# Patient Record
Sex: MALE | URBAN - METROPOLITAN AREA
[De-identification: names, ages, dates, MRNs, and addresses within clinical notes are randomized per-mention and may not be internally consistent; named-entity substitution may affect disease eponyms.]

---

## 2018-01-15 ENCOUNTER — APPOINTMENT (OUTPATIENT)
Dept: GENERAL RADIOLOGY | Facility: CLINIC | Age: 48
End: 2018-01-15
Attending: EMERGENCY MEDICINE
Payer: COMMERCIAL

## 2018-01-15 ENCOUNTER — APPOINTMENT (OUTPATIENT)
Dept: CARDIOLOGY | Facility: CLINIC | Age: 48
DRG: 247 | End: 2018-01-15
Attending: INTERNAL MEDICINE
Payer: COMMERCIAL

## 2018-01-15 ENCOUNTER — HOSPITAL ENCOUNTER (INPATIENT)
Facility: CLINIC | Age: 48
LOS: 2 days | Discharge: HOME | DRG: 247 | End: 2018-01-18
Attending: INTERNAL MEDICINE | Admitting: INTERNAL MEDICINE
Payer: COMMERCIAL

## 2018-01-15 ENCOUNTER — HOSPITAL ENCOUNTER (EMERGENCY)
Facility: CLINIC | Age: 48
Discharge: ANOTHER HOSPITAL | End: 2018-01-15
Attending: EMERGENCY MEDICINE | Admitting: EMERGENCY MEDICINE
Payer: COMMERCIAL

## 2018-01-15 VITALS
WEIGHT: 180 LBS | DIASTOLIC BLOOD PRESSURE: 112 MMHG | OXYGEN SATURATION: 97 % | RESPIRATION RATE: 18 BRPM | SYSTOLIC BLOOD PRESSURE: 152 MMHG | TEMPERATURE: 98.8 F

## 2018-01-15 DIAGNOSIS — I25.10 CORONARY ARTERY DISEASE INVOLVING NATIVE CORONARY ARTERY OF NATIVE HEART WITHOUT ANGINA PECTORIS: ICD-10-CM

## 2018-01-15 DIAGNOSIS — I21.02 ST ELEVATION MYOCARDIAL INFARCTION INVOLVING LEFT ANTERIOR DESCENDING (LAD) CORONARY ARTERY (H): Primary | ICD-10-CM

## 2018-01-15 DIAGNOSIS — I21.3 ST ELEVATION MYOCARDIAL INFARCTION (STEMI), UNSPECIFIED ARTERY (H): ICD-10-CM

## 2018-01-15 LAB
ANION GAP SERPL CALCULATED.3IONS-SCNC: 9 MMOL/L (ref 3–14)
BASOPHILS # BLD AUTO: 0.1 10E9/L (ref 0–0.2)
BASOPHILS NFR BLD AUTO: 0.5 %
BUN SERPL-MCNC: 17 MG/DL (ref 7–30)
CALCIUM SERPL-MCNC: 8.4 MG/DL (ref 8.5–10.1)
CHLORIDE SERPL-SCNC: 102 MMOL/L (ref 94–109)
CO2 SERPL-SCNC: 26 MMOL/L (ref 20–32)
CREAT SERPL-MCNC: 0.85 MG/DL (ref 0.66–1.25)
DIFFERENTIAL METHOD BLD: NORMAL
EOSINOPHIL # BLD AUTO: 0.1 10E9/L (ref 0–0.7)
EOSINOPHIL NFR BLD AUTO: 1.2 %
ERYTHROCYTE [DISTWIDTH] IN BLOOD BY AUTOMATED COUNT: 12.2 % (ref 10–15)
GFR SERPL CREATININE-BSD FRML MDRD: >90 ML/MIN/1.7M2
GLUCOSE SERPL-MCNC: 153 MG/DL (ref 70–99)
HCT VFR BLD AUTO: 47.2 % (ref 40–53)
HGB BLD-MCNC: 16.1 G/DL (ref 13.3–17.7)
IMM GRANULOCYTES # BLD: 0 10E9/L (ref 0–0.4)
IMM GRANULOCYTES NFR BLD: 0.3 %
LYMPHOCYTES # BLD AUTO: 5.2 10E9/L (ref 0.8–5.3)
LYMPHOCYTES NFR BLD AUTO: 55.7 %
MCH RBC QN AUTO: 29.9 PG (ref 26.5–33)
MCHC RBC AUTO-ENTMCNC: 34.1 G/DL (ref 31.5–36.5)
MCV RBC AUTO: 88 FL (ref 78–100)
MONOCYTES # BLD AUTO: 0.7 10E9/L (ref 0–1.3)
MONOCYTES NFR BLD AUTO: 7.8 %
NEUTROPHILS # BLD AUTO: 3.2 10E9/L (ref 1.6–8.3)
NEUTROPHILS NFR BLD AUTO: 34.5 %
NRBC # BLD AUTO: 0 10*3/UL
NRBC BLD AUTO-RTO: 0 /100
PLATELET # BLD AUTO: 262 10E9/L (ref 150–450)
PLATELET # BLD EST: NORMAL 10*3/UL
POTASSIUM SERPL-SCNC: 2.8 MMOL/L (ref 3.4–5.3)
RBC # BLD AUTO: 5.39 10E12/L (ref 4.4–5.9)
RBC MORPH BLD: NORMAL
SODIUM SERPL-SCNC: 137 MMOL/L (ref 133–144)
TROPONIN I SERPL-MCNC: <0.015 UG/L (ref 0–0.04)
VARIANT LYMPHS BLD QL SMEAR: PRESENT
WBC # BLD AUTO: 9.4 10E9/L (ref 4–11)

## 2018-01-15 PROCEDURE — 27210856 ZZH ACCESS HEART CATH CR2

## 2018-01-15 PROCEDURE — 85025 COMPLETE CBC W/AUTO DIFF WBC: CPT | Performed by: EMERGENCY MEDICINE

## 2018-01-15 PROCEDURE — 27210914 ZZH SHEATH CR8

## 2018-01-15 PROCEDURE — 25000125 ZZHC RX 250: Performed by: INTERNAL MEDICINE

## 2018-01-15 PROCEDURE — 99152 MOD SED SAME PHYS/QHP 5/>YRS: CPT

## 2018-01-15 PROCEDURE — 99152 MOD SED SAME PHYS/QHP 5/>YRS: CPT | Mod: GC | Performed by: INTERNAL MEDICINE

## 2018-01-15 PROCEDURE — C9606 PERC D-E COR REVASC W AMI S: HCPCS

## 2018-01-15 PROCEDURE — 93458 L HRT ARTERY/VENTRICLE ANGIO: CPT

## 2018-01-15 PROCEDURE — 27210946 ZZH KIT HC TOTES DISP CR8

## 2018-01-15 PROCEDURE — C1887 CATHETER, GUIDING: HCPCS

## 2018-01-15 PROCEDURE — C1769 GUIDE WIRE: HCPCS

## 2018-01-15 PROCEDURE — 99285 EMERGENCY DEPT VISIT HI MDM: CPT | Mod: 25

## 2018-01-15 PROCEDURE — 85347 COAGULATION TIME ACTIVATED: CPT

## 2018-01-15 PROCEDURE — B2151ZZ FLUOROSCOPY OF LEFT HEART USING LOW OSMOLAR CONTRAST: ICD-10-PCS | Performed by: INTERNAL MEDICINE

## 2018-01-15 PROCEDURE — 027034Z DILATION OF CORONARY ARTERY, ONE ARTERY WITH DRUG-ELUTING INTRALUMINAL DEVICE, PERCUTANEOUS APPROACH: ICD-10-PCS | Performed by: INTERNAL MEDICINE

## 2018-01-15 PROCEDURE — 92941 PRQ TRLML REVSC TOT OCCL AMI: CPT | Mod: LD | Performed by: INTERNAL MEDICINE

## 2018-01-15 PROCEDURE — 27210787 ZZH MANIFOLD CR2

## 2018-01-15 PROCEDURE — 4A023N7 MEASUREMENT OF CARDIAC SAMPLING AND PRESSURE, LEFT HEART, PERCUTANEOUS APPROACH: ICD-10-PCS | Performed by: INTERNAL MEDICINE

## 2018-01-15 PROCEDURE — C1725 CATH, TRANSLUMIN NON-LASER: HCPCS

## 2018-01-15 PROCEDURE — 27210759 ZZH DEVICE INFLATION CR6

## 2018-01-15 PROCEDURE — 93458 L HRT ARTERY/VENTRICLE ANGIO: CPT | Mod: 26 | Performed by: INTERNAL MEDICINE

## 2018-01-15 PROCEDURE — 25000132 ZZH RX MED GY IP 250 OP 250 PS 637: Performed by: EMERGENCY MEDICINE

## 2018-01-15 PROCEDURE — C1874 STENT, COATED/COV W/DEL SYS: HCPCS

## 2018-01-15 PROCEDURE — 99153 MOD SED SAME PHYS/QHP EA: CPT

## 2018-01-15 PROCEDURE — 27210795 ZZH PAD DEFIB QUICK CR4

## 2018-01-15 PROCEDURE — 93005 ELECTROCARDIOGRAM TRACING: CPT

## 2018-01-15 PROCEDURE — B2111ZZ FLUOROSCOPY OF MULTIPLE CORONARY ARTERIES USING LOW OSMOLAR CONTRAST: ICD-10-PCS | Performed by: INTERNAL MEDICINE

## 2018-01-15 PROCEDURE — 84484 ASSAY OF TROPONIN QUANT: CPT | Performed by: EMERGENCY MEDICINE

## 2018-01-15 PROCEDURE — 80048 BASIC METABOLIC PNL TOTAL CA: CPT | Performed by: EMERGENCY MEDICINE

## 2018-01-15 PROCEDURE — 27210892 ZZH CATH CR4

## 2018-01-15 PROCEDURE — 25000128 H RX IP 250 OP 636: Performed by: INTERNAL MEDICINE

## 2018-01-15 PROCEDURE — 71045 X-RAY EXAM CHEST 1 VIEW: CPT

## 2018-01-15 PROCEDURE — 27211089 ZZH KIT ACIST INJECTOR CR3

## 2018-01-15 RX ORDER — POTASSIUM CHLORIDE 7.45 MG/ML
10 INJECTION INTRAVENOUS
Status: DISCONTINUED | OUTPATIENT
Start: 2018-01-15 | End: 2018-01-16 | Stop reason: HOSPADM

## 2018-01-15 RX ORDER — HEPARIN SODIUM 1000 [USP'U]/ML
1000-10000 INJECTION, SOLUTION INTRAVENOUS; SUBCUTANEOUS EVERY 5 MIN PRN
Status: DISCONTINUED | OUTPATIENT
Start: 2018-01-15 | End: 2018-01-16 | Stop reason: HOSPADM

## 2018-01-15 RX ORDER — PRASUGREL 10 MG/1
10-60 TABLET, FILM COATED ORAL
Status: DISCONTINUED | OUTPATIENT
Start: 2018-01-15 | End: 2018-01-16 | Stop reason: HOSPADM

## 2018-01-15 RX ORDER — NITROGLYCERIN 5 MG/ML
100-500 VIAL (ML) INTRAVENOUS
Status: DISCONTINUED | OUTPATIENT
Start: 2018-01-15 | End: 2018-01-16 | Stop reason: HOSPADM

## 2018-01-15 RX ORDER — POTASSIUM CHLORIDE 29.8 MG/ML
20 INJECTION INTRAVENOUS
Status: DISCONTINUED | OUTPATIENT
Start: 2018-01-15 | End: 2018-01-16 | Stop reason: HOSPADM

## 2018-01-15 RX ORDER — NITROGLYCERIN 20 MG/100ML
.07-2 INJECTION INTRAVENOUS CONTINUOUS PRN
Status: DISCONTINUED | OUTPATIENT
Start: 2018-01-15 | End: 2018-01-16 | Stop reason: HOSPADM

## 2018-01-15 RX ORDER — PROMETHAZINE HYDROCHLORIDE 25 MG/ML
6.25-25 INJECTION, SOLUTION INTRAMUSCULAR; INTRAVENOUS EVERY 4 HOURS PRN
Status: DISCONTINUED | OUTPATIENT
Start: 2018-01-15 | End: 2018-01-16 | Stop reason: HOSPADM

## 2018-01-15 RX ORDER — NITROGLYCERIN 5 MG/ML
100-200 VIAL (ML) INTRAVENOUS
Status: DISCONTINUED | OUTPATIENT
Start: 2018-01-15 | End: 2018-01-16 | Stop reason: HOSPADM

## 2018-01-15 RX ORDER — FUROSEMIDE 10 MG/ML
20-100 INJECTION INTRAMUSCULAR; INTRAVENOUS
Status: DISCONTINUED | OUTPATIENT
Start: 2018-01-15 | End: 2018-01-16 | Stop reason: HOSPADM

## 2018-01-15 RX ORDER — LIDOCAINE HYDROCHLORIDE 10 MG/ML
1-10 INJECTION, SOLUTION EPIDURAL; INFILTRATION; INTRACAUDAL; PERINEURAL
Status: DISCONTINUED | OUTPATIENT
Start: 2018-01-15 | End: 2018-01-16 | Stop reason: HOSPADM

## 2018-01-15 RX ORDER — NICARDIPINE HYDROCHLORIDE 2.5 MG/ML
100 INJECTION INTRAVENOUS
Status: DISCONTINUED | OUTPATIENT
Start: 2018-01-15 | End: 2018-01-16 | Stop reason: HOSPADM

## 2018-01-15 RX ORDER — ENALAPRILAT 1.25 MG/ML
1.25-2.5 INJECTION INTRAVENOUS
Status: DISCONTINUED | OUTPATIENT
Start: 2018-01-15 | End: 2018-01-16 | Stop reason: HOSPADM

## 2018-01-15 RX ORDER — POTASSIUM CL/LIDO/0.9 % NACL 10MEQ/0.1L
10 INTRAVENOUS SOLUTION, PIGGYBACK (ML) INTRAVENOUS
Status: COMPLETED | OUTPATIENT
Start: 2018-01-15 | End: 2018-01-16

## 2018-01-15 RX ORDER — NITROGLYCERIN 5 MG/ML
100-500 VIAL (ML) INTRAVENOUS
Status: COMPLETED | OUTPATIENT
Start: 2018-01-15 | End: 2018-01-15

## 2018-01-15 RX ORDER — PHENYLEPHRINE HCL IN 0.9% NACL 1 MG/10 ML
20-100 SYRINGE (ML) INTRAVENOUS
Status: DISCONTINUED | OUTPATIENT
Start: 2018-01-15 | End: 2018-01-16 | Stop reason: HOSPADM

## 2018-01-15 RX ORDER — DEXTROSE MONOHYDRATE 25 G/50ML
12.5-5 INJECTION, SOLUTION INTRAVENOUS EVERY 30 MIN PRN
Status: DISCONTINUED | OUTPATIENT
Start: 2018-01-15 | End: 2018-01-16 | Stop reason: HOSPADM

## 2018-01-15 RX ORDER — LORAZEPAM 2 MG/ML
.5-2 INJECTION INTRAMUSCULAR EVERY 4 HOURS PRN
Status: DISCONTINUED | OUTPATIENT
Start: 2018-01-15 | End: 2018-01-16 | Stop reason: HOSPADM

## 2018-01-15 RX ORDER — FENTANYL CITRATE 50 UG/ML
25-50 INJECTION, SOLUTION INTRAMUSCULAR; INTRAVENOUS
Status: DISCONTINUED | OUTPATIENT
Start: 2018-01-15 | End: 2018-01-16 | Stop reason: HOSPADM

## 2018-01-15 RX ORDER — VERAPAMIL HYDROCHLORIDE 2.5 MG/ML
1-2.5 INJECTION, SOLUTION INTRAVENOUS
Status: DISCONTINUED | OUTPATIENT
Start: 2018-01-15 | End: 2018-01-16 | Stop reason: HOSPADM

## 2018-01-15 RX ORDER — NITROGLYCERIN 0.4 MG/1
0.4 TABLET SUBLINGUAL EVERY 5 MIN PRN
Status: DISCONTINUED | OUTPATIENT
Start: 2018-01-15 | End: 2018-01-15 | Stop reason: HOSPADM

## 2018-01-15 RX ORDER — DOPAMINE HYDROCHLORIDE 160 MG/100ML
2-20 INJECTION, SOLUTION INTRAVENOUS CONTINUOUS PRN
Status: DISCONTINUED | OUTPATIENT
Start: 2018-01-15 | End: 2018-01-16 | Stop reason: HOSPADM

## 2018-01-15 RX ORDER — ASPIRIN 325 MG
325 TABLET ORAL
Status: DISCONTINUED | OUTPATIENT
Start: 2018-01-15 | End: 2018-01-16 | Stop reason: HOSPADM

## 2018-01-15 RX ORDER — ATROPINE SULFATE 0.1 MG/ML
.5-1 INJECTION INTRAVENOUS
Status: DISCONTINUED | OUTPATIENT
Start: 2018-01-15 | End: 2018-01-16 | Stop reason: HOSPADM

## 2018-01-15 RX ORDER — NIFEDIPINE 10 MG/1
10 CAPSULE ORAL
Status: DISCONTINUED | OUTPATIENT
Start: 2018-01-15 | End: 2018-01-16 | Stop reason: HOSPADM

## 2018-01-15 RX ORDER — METOPROLOL TARTRATE 1 MG/ML
5 INJECTION, SOLUTION INTRAVENOUS EVERY 5 MIN PRN
Status: DISCONTINUED | OUTPATIENT
Start: 2018-01-15 | End: 2018-01-16 | Stop reason: HOSPADM

## 2018-01-15 RX ORDER — ASPIRIN 81 MG/1
324 TABLET, CHEWABLE ORAL ONCE
Status: COMPLETED | OUTPATIENT
Start: 2018-01-15 | End: 2018-01-15

## 2018-01-15 RX ORDER — CLOPIDOGREL 300 MG/1
300-600 TABLET, FILM COATED ORAL
Status: DISCONTINUED | OUTPATIENT
Start: 2018-01-15 | End: 2018-01-16 | Stop reason: HOSPADM

## 2018-01-15 RX ORDER — NALOXONE HYDROCHLORIDE 0.4 MG/ML
0.4 INJECTION, SOLUTION INTRAMUSCULAR; INTRAVENOUS; SUBCUTANEOUS EVERY 5 MIN PRN
Status: DISCONTINUED | OUTPATIENT
Start: 2018-01-15 | End: 2018-01-16 | Stop reason: HOSPADM

## 2018-01-15 RX ORDER — DOBUTAMINE HYDROCHLORIDE 200 MG/100ML
2-20 INJECTION INTRAVENOUS CONTINUOUS PRN
Status: DISCONTINUED | OUTPATIENT
Start: 2018-01-15 | End: 2018-01-16 | Stop reason: HOSPADM

## 2018-01-15 RX ORDER — NITROGLYCERIN 0.4 MG/1
0.4 TABLET SUBLINGUAL EVERY 5 MIN PRN
Status: DISCONTINUED | OUTPATIENT
Start: 2018-01-15 | End: 2018-01-16 | Stop reason: HOSPADM

## 2018-01-15 RX ORDER — VERAPAMIL HYDROCHLORIDE 2.5 MG/ML
1-2.5 INJECTION, SOLUTION INTRAVENOUS
Status: COMPLETED | OUTPATIENT
Start: 2018-01-15 | End: 2018-01-15

## 2018-01-15 RX ORDER — FLUMAZENIL 0.1 MG/ML
0.2 INJECTION, SOLUTION INTRAVENOUS
Status: DISCONTINUED | OUTPATIENT
Start: 2018-01-15 | End: 2018-01-16 | Stop reason: HOSPADM

## 2018-01-15 RX ORDER — BUPIVACAINE HYDROCHLORIDE 2.5 MG/ML
1-10 INJECTION, SOLUTION EPIDURAL; INFILTRATION; INTRACAUDAL
Status: DISCONTINUED | OUTPATIENT
Start: 2018-01-15 | End: 2018-01-16 | Stop reason: HOSPADM

## 2018-01-15 RX ORDER — PROTAMINE SULFATE 10 MG/ML
1-5 INJECTION, SOLUTION INTRAVENOUS
Status: DISCONTINUED | OUTPATIENT
Start: 2018-01-15 | End: 2018-01-16 | Stop reason: HOSPADM

## 2018-01-15 RX ORDER — ASPIRIN 81 MG/1
81-324 TABLET, CHEWABLE ORAL
Status: DISCONTINUED | OUTPATIENT
Start: 2018-01-15 | End: 2018-01-16 | Stop reason: HOSPADM

## 2018-01-15 RX ORDER — METHYLPREDNISOLONE SODIUM SUCCINATE 125 MG/2ML
125 INJECTION, POWDER, LYOPHILIZED, FOR SOLUTION INTRAMUSCULAR; INTRAVENOUS
Status: DISCONTINUED | OUTPATIENT
Start: 2018-01-15 | End: 2018-01-16 | Stop reason: HOSPADM

## 2018-01-15 RX ORDER — MORPHINE SULFATE 2 MG/ML
1-2 INJECTION, SOLUTION INTRAMUSCULAR; INTRAVENOUS EVERY 5 MIN PRN
Status: DISCONTINUED | OUTPATIENT
Start: 2018-01-15 | End: 2018-01-16 | Stop reason: HOSPADM

## 2018-01-15 RX ORDER — ADENOSINE 3 MG/ML
12-12000 INJECTION, SOLUTION INTRAVENOUS
Status: DISCONTINUED | OUTPATIENT
Start: 2018-01-15 | End: 2018-01-16 | Stop reason: HOSPADM

## 2018-01-15 RX ORDER — LIDOCAINE HYDROCHLORIDE 10 MG/ML
30 INJECTION, SOLUTION EPIDURAL; INFILTRATION; INTRACAUDAL; PERINEURAL
Status: DISCONTINUED | OUTPATIENT
Start: 2018-01-15 | End: 2018-01-16 | Stop reason: HOSPADM

## 2018-01-15 RX ORDER — HYDRALAZINE HYDROCHLORIDE 20 MG/ML
10-20 INJECTION INTRAMUSCULAR; INTRAVENOUS
Status: DISCONTINUED | OUTPATIENT
Start: 2018-01-15 | End: 2018-01-16 | Stop reason: HOSPADM

## 2018-01-15 RX ORDER — CLOPIDOGREL BISULFATE 75 MG/1
75 TABLET ORAL
Status: DISCONTINUED | OUTPATIENT
Start: 2018-01-15 | End: 2018-01-16 | Stop reason: HOSPADM

## 2018-01-15 RX ORDER — ONDANSETRON 2 MG/ML
4 INJECTION INTRAMUSCULAR; INTRAVENOUS EVERY 4 HOURS PRN
Status: DISCONTINUED | OUTPATIENT
Start: 2018-01-15 | End: 2018-01-16 | Stop reason: HOSPADM

## 2018-01-15 RX ORDER — SODIUM NITROPRUSSIDE 25 MG/ML
100-200 INJECTION INTRAVENOUS
Status: DISCONTINUED | OUTPATIENT
Start: 2018-01-15 | End: 2018-01-16 | Stop reason: HOSPADM

## 2018-01-15 RX ORDER — DIPHENHYDRAMINE HYDROCHLORIDE 50 MG/ML
25-50 INJECTION INTRAMUSCULAR; INTRAVENOUS
Status: DISCONTINUED | OUTPATIENT
Start: 2018-01-15 | End: 2018-01-16 | Stop reason: HOSPADM

## 2018-01-15 RX ORDER — PROTAMINE SULFATE 10 MG/ML
25-100 INJECTION, SOLUTION INTRAVENOUS EVERY 5 MIN PRN
Status: DISCONTINUED | OUTPATIENT
Start: 2018-01-15 | End: 2018-01-16 | Stop reason: HOSPADM

## 2018-01-15 RX ORDER — EPINEPHRINE 1 MG/ML
0.3 INJECTION, SOLUTION, CONCENTRATE INTRAVENOUS
Status: DISCONTINUED | OUTPATIENT
Start: 2018-01-15 | End: 2018-01-16 | Stop reason: HOSPADM

## 2018-01-15 RX ADMIN — SODIUM CHLORIDE 600 ML: 9 INJECTION, SOLUTION INTRAVENOUS at 23:35

## 2018-01-15 RX ADMIN — NITROGLYCERIN 0.4 MG: 0.4 TABLET SUBLINGUAL at 22:43

## 2018-01-15 RX ADMIN — Medication 7000 UNITS: at 23:32

## 2018-01-15 RX ADMIN — MIDAZOLAM 2.5 MG: 1 INJECTION INTRAMUSCULAR; INTRAVENOUS at 23:54

## 2018-01-15 RX ADMIN — VERAPAMIL HYDROCHLORIDE 2.5 MG: 2.5 INJECTION, SOLUTION INTRAVENOUS at 23:29

## 2018-01-15 RX ADMIN — NITROGLYCERIN 0.4 MG: 0.4 TABLET SUBLINGUAL at 22:38

## 2018-01-15 RX ADMIN — Medication 10 MEQ: at 23:34

## 2018-01-15 RX ADMIN — FENTANYL CITRATE 125 MCG: 50 INJECTION, SOLUTION INTRAMUSCULAR; INTRAVENOUS at 23:54

## 2018-01-15 RX ADMIN — NITROGLYCERIN 300 MCG: 5 INJECTION, SOLUTION INTRAVENOUS at 23:59

## 2018-01-15 RX ADMIN — NITROGLYCERIN 400 MCG: 5 INJECTION, SOLUTION INTRAVENOUS at 23:29

## 2018-01-15 RX ADMIN — NITROGLYCERIN 200 MCG: 5 INJECTION, SOLUTION INTRAVENOUS at 23:44

## 2018-01-15 RX ADMIN — NITROGLYCERIN 0.4 MG: 0.4 TABLET SUBLINGUAL at 22:30

## 2018-01-15 RX ADMIN — TICAGRELOR 180 MG: 90 TABLET ORAL at 22:38

## 2018-01-15 RX ADMIN — ONDANSETRON 4 MG: 2 INJECTION INTRAMUSCULAR; INTRAVENOUS at 23:33

## 2018-01-15 RX ADMIN — ASPIRIN 81 MG 324 MG: 81 TABLET ORAL at 22:36

## 2018-01-15 ASSESSMENT — ENCOUNTER SYMPTOMS
VOMITING: 0
NAUSEA: 0
SHORTNESS OF BREATH: 1
DIAPHORESIS: 0

## 2018-01-15 NOTE — IP AVS SNAPSHOT
MRN:[id number]                      After Visit Summary   1/15/2018    Floresita Vanegas    MRN: [id number]           Thank you!     Thank you for choosing [facility] for your care. Our goal is always to provide you with excellent care. Hearing back from our patients is one way we can continue to improve our services. Please take a few minutes to complete the written survey that you may receive in the mail after you visit with us. Thank you!        Patient Information     Date Of Birth          1970        Designated Caregiver       Most Recent Value    Caregiver    Will someone help with your care after discharge? no      About your hospital stay     You were admitted on:  January 15, 2018 You last received care in the:  [facility]    You were discharged on:  January 18, 2018        Reason for your hospital stay       You had a heart attack, a stent was placed in your left anterior descending artery.                  Who to Call     For medical emergencies, please call 911.  For non-urgent questions about your medical care, please call your primary care provider or clinic, [phone]          Attending Provider     Provider David Vaughn MD Cardiology       Primary Care Provider Office Phone # Fax #    Tad Correa [phone]      Follow-up Appointments     Follow Up and recommended labs and tests       Follow up with primary care provider, Dr. Tad Correa, within 7 days for hospital follow- up.  The following labs/tests are recommended: BMP and lipids.    You need to establish care with a cardiologist at [facility], you should be seen with in 7-10 days. They will need to connect you to their cardiac rehab.    -A follow-up appointment has been made for you with Dr. Tad Correa on  Monday, January 29th at 1:20 PM at [facility].  Please call the clinic at [phone] should you need to  change or cancel your appointment.    [facility]  [address] [place] [address]   [phone]        -A follow-up appointment has been made for you with Dr. Reed on Tuesday, February 6th at 10:00 AM at  [facility]. Please call the clinic at [phone] should you need to change or cancel your appointment.    [facility]  [address]  [place], [place] [address] [phone]                  Your next 10 appointments already scheduled     Jan 25, 2018  8:00 AM CST   Cardiac Evaluation with [facility] [address] [place] [address] ([facility])    [address] [facility], [facility] [address]  [facility] [id number] [phone]              Additional Services     CARDIAC REHAB REFERRAL       Patient may choose their preference of the site for Cardiac Rehab.                  Further instructions from your care team       -A follow-up appointment has been made for you with Dr. Tad Correa on  Monday, January 29th at 1:20 PM at [facility].  Please call the clinic at [phone] should you need to change or cancel your appointment.    [facility]  [address] [place], [place] [address] [phone]        -A follow-up appointment has been made for you with Dr. Reed on Tuesday, February 6th at 10:00 AM at  [facility]. Please call the clinic at [phone] should you need to change or cancel your appointment.    [facility]  [address] [facility]  [place], [place] [address] [phone]    A follow-up appointment was scheduled for you [see above].  It is very important to go to it--bring these papers and your insurance card with you.  At the visit, talk about your hospital stay.  Tell your doctor how you feel.  Show your new list of medications.  Your doctor will update records, make sure you are still  doing OK, and decide if any tests or medication changes are needed.          You had complaints of feeling lightheaded while in hospital. It may take some time for your body to adjust to lower blood pressures and sedation to wear off. If after a couple days your symptoms do not improve or are worsening contact the heart clinic.   Rise slowly from lying to sitting to standing. Also, monitor your blood pressures and heart rate at home. If your heart rate is below 55 and symptomatic (dizzy) or Systolic (top number) is below 100 contact your heart   clinic.      Lifelong aspirin, Brilinta 90 BID for at least a year uninterrupted    Cardiac Angiogram Discharge Instructions - Radial    After you go home:      Have an adult stay with you until tomorrow.    Drink extra fluids for 2 days.    You may resume your normal diet.    No smoking       For 24 hours - due to the sedation you received:    Relax and take it easy.    Do NOT make any important or legal decisions.    Do NOT drive or operate machines at home or at work.    Do NOT drink alcohol.    Care of Wrist Puncture Site:      For the first 24 hrs - check the puncture site every 1-2 hours while awake.    It is normal to have soreness at the puncture site and mild tingling in your hand for up to 3 days.    Remove the bandaid after 24 hours. If there is minor oozing, apply another bandaid and remove it after 12 hours.    You may shower tomorrow.  Do NOT take a bath, or use a hot tub or pool for at least 3 days. Do NOT scrub the site. Do not use lotion or powder near the puncture site.           Activity:        For 2 days:     do not use your hand or arm to support your weight (such as rising from a chair)     do not bend your wrist (such as lifting a garage door).    do not lift more than 5 pounds or exercise your arm (such as tennis, golf or bowling).    Do NOT do any heavy activity such as exercise, lifting, or straining.     Bleeding:      If you start bleeding from  the site in your wrist, sit down and press firmly on/above the site for 10 minutes.     Once bleeding stops, keep arm still for 2 hours.     Call [facility] as soon as you can.       Call 911 right away if you have heavy bleeding or bleeding that does not stop.      Medicines:      If you are taking antiplatelet medications such as Plavix, Brilinta or Effient, do not stop taking it until you talk to your cardiologist.        If you are on Metformin (Glucophage), do not restart it until you have blood tests (within 2 to 3 days after discharge).  After you have your blood drawn, you may restart the Metformin.     Take your medications, including blood thinners, unless your provider tells you not to.  If you take Coumadin (Warfarin), have your INR checked by your provider in  3-5 days. Call your clinic to schedule this.    If you have stopped any medicines, check with your provider about when to restart them.    Follow Up Appointments:      Follow up with [facility] Nurse Practitioner at [facility] of patient preference in 7-10 days.    Call the clinic if:      You have a large or growing hard lump around the site.    The site is red, swollen, hot or tender.    Blood or fluid is draining from the site.    You have chills or a fever greater than 101 F (38 C).    Your arm turns feels numb, cool or changes color.    You have hives, a rash or unusual itching.    Any questions or concerns.                          Pending Results     No orders found from 1/13/2018 to 1/16/2018.            Statement of Approval     Ordered          01/18/18 1021  I have reviewed and agree with all the recommendations and orders detailed in this document.  EFFECTIVE NOW     Approved and electronically signed by:  Mary Ann Hinojosa APRN CNP             Admission Information     Date & Time Provider Department Dept. Phone    1/15/2018 David Worley MD [facility] [phone]      Your  "Vitals Were     Blood Pressure Temperature Respirations Height Weight Pulse Oximetry    119/83 (BP Location: Left arm) 98.7  F (37.1  C) (Oral) 16 1.702 m (5' 7\") 84.1 kg (185 lb 8 oz) 99%    BMI (Body Mass Index)                   29.05 kg/m2           [employer] Information     [employer] lets you send messages to your doctor, view your test results, renew your prescriptions, schedule appointments and more. To sign up, go to [email or web address] . Click on \"Log in\" on the left side of the screen, which will take you to the Welcome page. Then click on \"Sign up Now\" on the right side of the page.     You will be asked to enter the access code listed below, as well as some personal information. Please follow the directions to create your username and password.     Your access code is: [email or web address]  Expires: 2018 10:04 AM     Your access code will  in 90 days. If you need help or a new code, please call your [facility] clinic or [phone].        Care EveryWhere ID     This is your Care EveryWhere ID. This could be used by other organizations to access your [facility] medical records  [id number]        Equal Access to Services     GREGG COFFEY AH: Haleigh yooo Someghana, waaxda luqadaha, qaybta kaalmada adeegyada, lee roger. So [facility] [phone].    ATENCIÓN: Si habla español, tiene a sandoval disposición servicios gratuitos de asistencia lingüística. Llame al [phone].    We comply with applicable federal civil rights laws and [place] laws. We do not discriminate on the basis of race, color, national origin, age, disability, sex, sexual orientation, or gender identity.               Review of your medicines      START taking        Dose / Directions    aspirin 81 MG EC tablet   Used for:  Coronary artery disease involving native coronary artery of native heart without angina pectoris        Dose:  81 mg   Start taking on:  2018   Take 1 tablet (81 mg) by mouth daily "   Quantity:  30 tablet   Refills:  0       lisinopril 5 MG tablet   Commonly known as:  PRINIVIL/ZESTRIL   Used for:  ST elevation myocardial infarction involving left anterior descending (LAD) coronary artery (H)        Dose:  5 mg   Start taking on:  1/19/2018   Take 1 tablet (5 mg) by mouth daily   Quantity:  30 tablet   Refills:  0       metoprolol tartrate 25 MG tablet   Commonly known as:  LOPRESSOR   Used for:  Coronary artery disease involving native coronary artery of native heart without angina pectoris        Dose:  25 mg   Take 1 tablet (25 mg) by mouth 2 times daily   Quantity:  60 tablet   Refills:  0       nitroGLYcerin 0.4 MG sublingual tablet   Commonly known as:  NITROSTAT   Used for:  Coronary artery disease involving native coronary artery of native heart without angina pectoris        For chest pain place 1 tablet under the tongue every 5 minutes for 3 doses. If symptoms persist 5 minutes after 1st dose call 911.   Quantity:  25 tablet   Refills:  0       rosuvastatin 20 MG tablet   Commonly known as:  CRESTOR   Used for:  Coronary artery disease involving native coronary artery of native heart without angina pectoris        Dose:  20 mg   Start taking on:  1/19/2018   Take 1 tablet (20 mg) by mouth daily   Quantity:  30 tablet   Refills:  0       ticagrelor 90 MG tablet   Commonly known as:  BRILINTA   Used for:  ST elevation myocardial infarction involving left anterior descending (LAD) coronary artery (H)        Dose:  90 mg   Take 1 tablet (90 mg) by mouth every 12 hours   Quantity:  60 tablet   Refills:  0            Where to get your medicines      These medications were sent to [facility] - [address]  [address] [facility] [address], [place] [address]     Phone:  [phone]     aspirin 81 MG EC tablet    lisinopril 5 MG tablet    metoprolol tartrate 25 MG tablet    nitroGLYcerin 0.4 MG sublingual tablet    rosuvastatin 20 MG tablet    ticagrelor 90 MG tablet                 Protect others around you: Learn how to safely use, store and throw away your medicines at www.disposemymeds.org.             Medication List: This is a list of all your medications and when to take them. Check marks below indicate your daily home schedule. Keep this list as a reference.      Medications           Morning Afternoon Evening Bedtime As Needed    aspirin 81 MG EC tablet   Take 1 tablet (81 mg) by mouth daily   Start taking on:  1/19/2018   Last time this was given:  81 mg on 1/18/2018  8:40 AM                                   lisinopril 5 MG tablet   Commonly known as:  PRINIVIL/ZESTRIL   Take 1 tablet (5 mg) by mouth daily   Start taking on:  1/19/2018   Last time this was given:  5 mg on 1/18/2018  8:40 AM                                   metoprolol tartrate 25 MG tablet   Commonly known as:  LOPRESSOR   Take 1 tablet (25 mg) by mouth 2 times daily   Last time this was given:  25 mg on 1/18/2018  8:40 AM                                      nitroGLYcerin 0.4 MG sublingual tablet   Commonly known as:  NITROSTAT   For chest pain place 1 tablet under the tongue every 5 minutes for 3 doses. If symptoms persist 5 minutes after 1st dose call 911.                                   rosuvastatin 20 MG tablet   Commonly known as:  CRESTOR   Take 1 tablet (20 mg) by mouth daily   Start taking on:  1/19/2018   Last time this was given:  20 mg on 1/18/2018  8:40 AM                                   ticagrelor 90 MG tablet   Commonly known as:  BRILINTA   Take 1 tablet (90 mg) by mouth every 12 hours   Last time this was given:  90 mg on 1/18/2018  8:40 AM                                                More Information        Ticagrelor oral tablet  Brand Name: BRILINTA  What is this medicine?  TICAGRELOR (KIMBERLYN ka GREL or) helps to prevent blood clots. This medicine is used to prevent heart attack, stroke, or other vascular events in people who have had a recent heart attack or who have severe  chest pain.  How should I use this medicine?  Take this medicine by mouth with a glass of water. Follow the directions on the prescription label. You can take it with or without food. If it upsets your stomach, take it with food. Take your medicine at regular intervals. Do not take it more often than directed. Do not stop taking except on your doctor's advice.  Talk to you pediatrician regarding the use of this medicine in children. Special care may be needed.  What side effects may I notice from receiving this medicine?  Side effects that you should report to your doctor or health care professional as soon as possible:    allergic reactions like skin rash, itching or hives, swelling of the face, lips, or tongue    breathing problems    fast or irregular heartbeat    feeling faint or light-headed, falls    signs and symptoms of bleeding such as bloody or black, tarry stools; red or dark-brown urine; spitting up blood or brown material that looks like coffee grounds; red spots on the skin; unusual bruising or bleeding from the eye, gums, or nose  Side effects that usually do not require medical attention (report to your doctor or health care professional if they continue or are bothersome):    breast enlargement in both males and females    diarrhea    dizziness    headache    tiredness    upset stomach  What may interact with this medicine?    certain antibiotics like clarithromycin and telithromycin    certain medicines for fungal infections like itraconazole, ketoconazole, and voriconazole    certain medicines for HIV infection like atazanavir, indinavir, nelfinavir, ritonavir, and saquinavir    certain medicines for seizures like carbamazepine, phenobarbital, and phenytoin    certain medicines that treat or prevent blood clots like warfarin    dexamethasone    digoxin    lovastatin    nefazodone    rifampin    simvastatin  What if I miss a dose?  If you miss a dose, take it as soon as you can. If it is almost  time for your next dose, take only that dose. Do not take double or extra doses.  Where should I keep my medicine?  Keep out of the reach of children.  Store at room temperature of 59 to 86 degrees F (15 to 30 degrees C). Throw away any unused medicine after the expiration date.  What should I tell my health care provider before I take this medicine?  They need to know if you have any of these conditions:    bleeding disorders    bleeding in the brain    having surgery    history of irregular heartbeat    history of stomach bleeding    liver disease    an unusual or allergic reaction to ticagrelor, other medicines, foods, dyes, or preservatives    pregnant or trying to get pregnant    breast-feeding  What should I watch for while using this medicine?  Visit your doctor or health care professional for regular check ups. Do not stop taking you medicine unless your doctor tells you to.  Notify your doctor or health care professional and seek emergency treatment if you develop breathing problems; changes in vision; chest pain; severe, sudden headache; pain, swelling, warmth in the leg; trouble speaking; sudden numbness or weakness of the face, arm, or leg. These can be signs that your condition has gotten worse.  If you are going to have surgery or dental work, tell your doctor or health care professional that you are taking this medicine.  You should take aspirin every day with this medicine. Do not take more than 100 mg each day. Talk to your doctor if you have questions.  NOTE:This sheet is a summary. It may not cover all possible information. If you have questions about this medicine, talk to your doctor, pharmacist, or health care provider. Copyright  2017 Elsevier                Metoprolol tablets  Brand Name: Lopressor  What is this medicine?  METOPROLOL (me TOE proe lole) is a beta-blocker. Beta-blockers reduce the workload on the heart and help it to beat more regularly. This medicine is used to treat high  blood pressure and to prevent chest pain. It is also used to after a heart attack and to prevent an additional heart attack from occurring.  How should I use this medicine?  Take this medicine by mouth with a drink of water. Follow the directions on the prescription label. Take this medicine immediately after meals. Take your doses at regular intervals. Do not take more medicine than directed. Do not stop taking this medicine suddenly. This could lead to serious heart-related effects.  Talk to your pediatrician regarding the use of this medicine in children. Special care may be needed.  What side effects may I notice from receiving this medicine?  Side effects that you should report to your doctor or health care professional as soon as possible:    allergic reactions like skin rash, itching or hives    cold or numb hands or feet    depression    difficulty breathing    faint    fever with sore throat    irregular heartbeat, chest pain    rapid weight gain    swollen legs or ankles  Side effects that usually do not require medical attention (report to your doctor or health care professional if they continue or are bothersome):    anxiety or nervousness    change in sex drive or performance    dry skin    headache    nightmares or trouble sleeping    short term memory loss    stomach upset or diarrhea    unusually tired  What may interact with this medicine?  This medicine may interact with the following medications:    certain medicines for blood pressure, heart disease, irregular heart beat    certain medicines for depression like monoamine oxidase (MAO) inhibitors, fluoxetine, or paroxetine    clonidine    dobutamine    epinephrine    isoproterenol    reserpine  What if I miss a dose?  If you miss a dose, take it as soon as you can. If it is almost time for your next dose, take only that dose. Do not take double or extra doses.  Where should I keep my medicine?  Keep out of the reach of children.  Store at room  temperature between 15 and 30 degrees C (59 and 86 degrees F). Throw away any unused medicine after the expiration date.  What should I tell my health care provider before I take this medicine?  They need to know if you have any of these conditions:    diabetes    heart or vessel disease like slow heart rate, worsening heart failure, heart block, sick sinus syndrome or Raynaud's disease    kidney disease    liver disease    lung or breathing disease, like asthma or emphysema    pheochromocytoma    thyroid disease    an unusual or allergic reaction to metoprolol, other beta-blockers, medicines, foods, dyes, or preservatives    pregnant or trying to get pregnant    breast-feeding  What should I watch for while using this medicine?  Visit your doctor or health care professional for regular check ups. Contact your doctor right away if your symptoms worsen. Check your blood pressure and pulse rate regularly. Ask your health care professional what your blood pressure and pulse rate should be, and when you should contact them.  You may get drowsy or dizzy. Do not drive, use machinery, or do anything that needs mental alertness until you know how this medicine affects you. Do not sit or stand up quickly, especially if you are an older patient. This reduces the risk of dizzy or fainting spells. Contact your doctor if these symptoms continue. Alcohol may interfere with the effect of this medicine. Avoid alcoholic drinks.  NOTE:This sheet is a summary. It may not cover all possible information. If you have questions about this medicine, talk to your doctor, pharmacist, or health care provider. Copyright  2017 Elsevier                Lisinopril Oral tablet  What is this medicine?  LISINOPRIL (lyse IN oh pril) is an ACE inhibitor. This medicine is used to treat high blood pressure and heart failure. It is also used to protect the heart immediately after a heart attack.  This medicine may be used for other purposes; ask your  health care provider or pharmacist if you have questions.  What should I tell my health care provider before I take this medicine?  They need to know if you have any of these conditions:    diabetes    heart or blood vessel disease    immune system disease like lupus or scleroderma    kidney disease    low blood pressure    previous swelling of the tongue, face, or lips with difficulty breathing, difficulty swallowing, hoarseness, or tightening of the throat    an unusual or allergic reaction to lisinopril, other ACE inhibitors, insect venom, foods, dyes, or preservatives    pregnant or trying to get pregnant    breast-feeding  How should I use this medicine?  Take this medicine by mouth with a glass of water. Follow the directions on your prescription label. You may take this medicine with or without food. Take your medicine at regular intervals. Do not stop taking this medicine except on the advice of your doctor or health care professional.  Talk to your pediatrician regarding the use of this medicine in children. Special care may be needed. While this drug may be prescribed for children as young as 6 years of age for selected conditions, precautions do apply.  Overdosage: If you think you have taken too much of this medicine contact a poison control center or emergency room at once.  NOTE: This medicine is only for you. Do not share this medicine with others.  What if I miss a dose?  If you miss a dose, take it as soon as you can. If it is almost time for your next dose, take only that dose. Do not take double or extra doses.  What may interact with this medicine?    diuretics    lithium    NSAIDs, medicines for pain and inflammation, like ibuprofen or naproxen    over-the-counter herbal supplements like hawthorn    potassium salts or potassium supplements    salt substitutes  This list may not describe all possible interactions. Give your health care provider a list of all the medicines, herbs, non-prescription  drugs, or dietary supplements you use. Also tell them if you smoke, drink alcohol, or use illegal drugs. Some items may interact with your medicine.  What should I watch for while using this medicine?  Visit your doctor or health care professional for regular check ups. Check your blood pressure as directed. Ask your doctor what your blood pressure should be, and when you should contact him or her. Call your doctor or health care professional if you notice an irregular or fast heart beat.  Women should inform their doctor if they wish to become pregnant or think they might be pregnant. There is a potential for serious side effects to an unborn child. Talk to your health care professional or pharmacist for more information.  Check with your doctor or health care professional if you get an attack of severe diarrhea, nausea and vomiting, or if you sweat a lot. The loss of too much body fluid can make it dangerous for you to take this medicine.  You may get drowsy or dizzy. Do not drive, use machinery, or do anything that needs mental alertness until you know how this drug affects you. Do not stand or sit up quickly, especially if you are an older patient. This reduces the risk of dizzy or fainting spells. Alcohol can make you more drowsy and dizzy. Avoid alcoholic drinks.  Avoid salt substitutes unless you are told otherwise by your doctor or health care professional.  Do not treat yourself for coughs, colds, or pain while you are taking this medicine without asking your doctor or health care professional for advice. Some ingredients may increase your blood pressure.  What side effects may I notice from receiving this medicine?  Side effects that you should report to your doctor or health care professional as soon as possible:    abdominal pain with or without nausea or vomiting    allergic reactions like skin rash or hives, swelling of the hands, feet, face, lips, throat, or tongue    dark urine    difficulty  breathing    dizzy, lightheaded or fainting spell    fever or sore throat    irregular heart beat, chest pain    pain or difficulty passing urine    redness, blistering, peeling or loosening of the skin, including inside the mouth    unusually weak    yellowing of the eyes or skin  Side effects that usually do not require medical attention (report to your doctor or health care professional if they continue or are bothersome):    change in taste    cough    decreased sexual function or desire    headache    sun sensitivity    tiredness  This list may not describe all possible side effects. Call your doctor for medical advice about side effects. You may report side effects to FDA at 6-713-FDA-3153.  Where should I keep my medicine?  Keep out of the reach of children.  Store at room temperature between 15 and 30 degrees C (59 and 86 degrees F). Protect from moisture. Keep container tightly closed. Throw away any unused medicine after the expiration date.  NOTE:This sheet is a summary. It may not cover all possible information. If you have questions about this medicine, talk to your doctor, pharmacist, or health care provider. Copyright  2016 Gold Standard

## 2018-01-15 NOTE — IP AVS SNAPSHOT
[facility]    [address] [place] [address]., [address]    [place] [address]    Phone:  [phone]                                       After Visit Summary   1/15/2018    Floresita Vanegas    MRN: [id number]           After Visit Summary Signature Page     I have received my discharge instructions, and my questions have been answered. I have discussed any challenges I see with this plan with the nurse or doctor.    ..........................................................................................................................................  Patient/Patient Representative Signature      ..........................................................................................................................................  Patient Representative Print Name and Relationship to Patient    ..................................................               ................................................  Date                                            Time    ..........................................................................................................................................  Reviewed by Signature/Title    ...................................................              ..............................................  Date                                                            Time

## 2018-01-16 PROBLEM — I21.02 STEMI INVOLVING LEFT ANTERIOR DESCENDING CORONARY ARTERY (H): Status: ACTIVE | Noted: 2018-01-16

## 2018-01-16 LAB
ANION GAP SERPL CALCULATED.3IONS-SCNC: 7 MMOL/L (ref 3–14)
BUN SERPL-MCNC: 11 MG/DL (ref 7–30)
CALCIUM SERPL-MCNC: 8.5 MG/DL (ref 8.5–10.1)
CHLORIDE SERPL-SCNC: 104 MMOL/L (ref 94–109)
CHOLEST SERPL-MCNC: 214 MG/DL
CO2 SERPL-SCNC: 25 MMOL/L (ref 20–32)
CREAT SERPL-MCNC: 0.76 MG/DL (ref 0.66–1.25)
ERYTHROCYTE [DISTWIDTH] IN BLOOD BY AUTOMATED COUNT: 12.7 % (ref 10–15)
GFR SERPL CREATININE-BSD FRML MDRD: >90 ML/MIN/1.7M2
GLUCOSE SERPL-MCNC: 102 MG/DL (ref 70–99)
HCT VFR BLD AUTO: 43.4 % (ref 40–53)
HDLC SERPL-MCNC: 40 MG/DL
HGB BLD-MCNC: 15 G/DL (ref 13.3–17.7)
INTERPRETATION ECG - MUSE: NORMAL
KCT BLD-ACNC: 526 SEC (ref 105–167)
LDLC SERPL CALC-MCNC: 143 MG/DL
MCH RBC QN AUTO: 30.2 PG (ref 26.5–33)
MCHC RBC AUTO-ENTMCNC: 34.6 G/DL (ref 31.5–36.5)
MCV RBC AUTO: 87 FL (ref 78–100)
NONHDLC SERPL-MCNC: 174 MG/DL
PLATELET # BLD AUTO: 216 10E9/L (ref 150–450)
POTASSIUM SERPL-SCNC: 4.1 MMOL/L (ref 3.4–5.3)
POTASSIUM SERPL-SCNC: 4.2 MMOL/L (ref 3.4–5.3)
RBC # BLD AUTO: 4.97 10E12/L (ref 4.4–5.9)
SODIUM SERPL-SCNC: 136 MMOL/L (ref 133–144)
TRIGL SERPL-MCNC: 155 MG/DL
TROPONIN I SERPL-MCNC: 120.61 UG/L (ref 0–0.04)
TROPONIN I SERPL-MCNC: 86.7 UG/L (ref 0–0.04)
WBC # BLD AUTO: 9.5 10E9/L (ref 4–11)

## 2018-01-16 PROCEDURE — 99291 CRITICAL CARE FIRST HOUR: CPT | Mod: 25 | Performed by: INTERNAL MEDICINE

## 2018-01-16 PROCEDURE — 27210787 ZZH MANIFOLD CR2

## 2018-01-16 PROCEDURE — 84484 ASSAY OF TROPONIN QUANT: CPT | Performed by: INTERNAL MEDICINE

## 2018-01-16 PROCEDURE — 36415 COLL VENOUS BLD VENIPUNCTURE: CPT | Performed by: INTERNAL MEDICINE

## 2018-01-16 PROCEDURE — 27210759 ZZH DEVICE INFLATION CR6

## 2018-01-16 PROCEDURE — 27210856 ZZH ACCESS HEART CATH CR2

## 2018-01-16 PROCEDURE — 25000132 ZZH RX MED GY IP 250 OP 250 PS 637: Performed by: INTERNAL MEDICINE

## 2018-01-16 PROCEDURE — 27210795 ZZH PAD DEFIB QUICK CR4

## 2018-01-16 PROCEDURE — 84132 ASSAY OF SERUM POTASSIUM: CPT | Performed by: INTERNAL MEDICINE

## 2018-01-16 PROCEDURE — 21000000 ZZH R&B IMCU HEART CARE

## 2018-01-16 PROCEDURE — 93010 ELECTROCARDIOGRAM REPORT: CPT | Mod: 76 | Performed by: INTERNAL MEDICINE

## 2018-01-16 PROCEDURE — 93005 ELECTROCARDIOGRAM TRACING: CPT

## 2018-01-16 PROCEDURE — 85027 COMPLETE CBC AUTOMATED: CPT | Performed by: INTERNAL MEDICINE

## 2018-01-16 PROCEDURE — C1769 GUIDE WIRE: HCPCS

## 2018-01-16 PROCEDURE — C1887 CATHETER, GUIDING: HCPCS

## 2018-01-16 PROCEDURE — 27210914 ZZH SHEATH CR8

## 2018-01-16 PROCEDURE — 27211089 ZZH KIT ACIST INJECTOR CR3

## 2018-01-16 PROCEDURE — 93010 ELECTROCARDIOGRAM REPORT: CPT | Performed by: INTERNAL MEDICINE

## 2018-01-16 PROCEDURE — C1725 CATH, TRANSLUMIN NON-LASER: HCPCS

## 2018-01-16 PROCEDURE — 27210946 ZZH KIT HC TOTES DISP CR8

## 2018-01-16 PROCEDURE — 99232 SBSQ HOSP IP/OBS MODERATE 35: CPT | Mod: 25 | Performed by: INTERNAL MEDICINE

## 2018-01-16 PROCEDURE — 25000128 H RX IP 250 OP 636: Performed by: INTERNAL MEDICINE

## 2018-01-16 PROCEDURE — 80048 BASIC METABOLIC PNL TOTAL CA: CPT | Performed by: INTERNAL MEDICINE

## 2018-01-16 PROCEDURE — 80061 LIPID PANEL: CPT | Performed by: INTERNAL MEDICINE

## 2018-01-16 PROCEDURE — 27210892 ZZH CATH CR4

## 2018-01-16 PROCEDURE — C1874 STENT, COATED/COV W/DEL SYS: HCPCS

## 2018-01-16 RX ORDER — ATROPINE SULFATE 0.1 MG/ML
0.5 INJECTION INTRAVENOUS EVERY 5 MIN PRN
Status: ACTIVE | OUTPATIENT
Start: 2018-01-16 | End: 2018-01-16

## 2018-01-16 RX ORDER — NITROGLYCERIN 0.4 MG/1
0.4 TABLET SUBLINGUAL EVERY 5 MIN PRN
Status: DISCONTINUED | OUTPATIENT
Start: 2018-01-16 | End: 2018-01-18 | Stop reason: HOSPADM

## 2018-01-16 RX ORDER — LISINOPRIL 5 MG/1
5 TABLET ORAL DAILY
Status: DISCONTINUED | OUTPATIENT
Start: 2018-01-16 | End: 2018-01-18 | Stop reason: HOSPADM

## 2018-01-16 RX ORDER — SODIUM CHLORIDE 9 MG/ML
INJECTION, SOLUTION INTRAVENOUS CONTINUOUS
Status: ACTIVE | OUTPATIENT
Start: 2018-01-16 | End: 2018-01-16

## 2018-01-16 RX ORDER — ACETAMINOPHEN 325 MG/1
325-650 TABLET ORAL EVERY 4 HOURS PRN
Status: DISCONTINUED | OUTPATIENT
Start: 2018-01-16 | End: 2018-01-18 | Stop reason: HOSPADM

## 2018-01-16 RX ORDER — IOPAMIDOL 755 MG/ML
150 INJECTION, SOLUTION INTRAVASCULAR ONCE
Status: COMPLETED | OUTPATIENT
Start: 2018-01-16 | End: 2018-01-16

## 2018-01-16 RX ORDER — ROSUVASTATIN CALCIUM 20 MG/1
20 TABLET, COATED ORAL DAILY
Status: DISCONTINUED | OUTPATIENT
Start: 2018-01-16 | End: 2018-01-18 | Stop reason: HOSPADM

## 2018-01-16 RX ORDER — ASPIRIN 81 MG/1
81 TABLET ORAL DAILY
Status: DISCONTINUED | OUTPATIENT
Start: 2018-01-16 | End: 2018-01-18 | Stop reason: HOSPADM

## 2018-01-16 RX ORDER — HYDROCODONE BITARTRATE AND ACETAMINOPHEN 5; 325 MG/1; MG/1
1-2 TABLET ORAL EVERY 4 HOURS PRN
Status: DISCONTINUED | OUTPATIENT
Start: 2018-01-16 | End: 2018-01-18 | Stop reason: HOSPADM

## 2018-01-16 RX ORDER — NALOXONE HYDROCHLORIDE 0.4 MG/ML
.2-.4 INJECTION, SOLUTION INTRAMUSCULAR; INTRAVENOUS; SUBCUTANEOUS
Status: ACTIVE | OUTPATIENT
Start: 2018-01-16 | End: 2018-01-16

## 2018-01-16 RX ORDER — NALOXONE HYDROCHLORIDE 0.4 MG/ML
.1-.4 INJECTION, SOLUTION INTRAMUSCULAR; INTRAVENOUS; SUBCUTANEOUS
Status: DISCONTINUED | OUTPATIENT
Start: 2018-01-16 | End: 2018-01-18 | Stop reason: HOSPADM

## 2018-01-16 RX ORDER — LIDOCAINE 40 MG/G
CREAM TOPICAL
Status: DISCONTINUED | OUTPATIENT
Start: 2018-01-16 | End: 2018-01-18 | Stop reason: HOSPADM

## 2018-01-16 RX ORDER — METOPROLOL TARTRATE 25 MG/1
25 TABLET, FILM COATED ORAL 2 TIMES DAILY
Status: DISCONTINUED | OUTPATIENT
Start: 2018-01-16 | End: 2018-01-18 | Stop reason: HOSPADM

## 2018-01-16 RX ORDER — FENTANYL CITRATE 50 UG/ML
25-50 INJECTION, SOLUTION INTRAMUSCULAR; INTRAVENOUS
Status: ACTIVE | OUTPATIENT
Start: 2018-01-16 | End: 2018-01-16

## 2018-01-16 RX ORDER — FLUMAZENIL 0.1 MG/ML
0.2 INJECTION, SOLUTION INTRAVENOUS
Status: ACTIVE | OUTPATIENT
Start: 2018-01-16 | End: 2018-01-16

## 2018-01-16 RX ADMIN — Medication 10 MEQ: at 01:04

## 2018-01-16 RX ADMIN — TICAGRELOR 90 MG: 90 TABLET ORAL at 20:21

## 2018-01-16 RX ADMIN — ROSUVASTATIN CALCIUM 20 MG: 20 TABLET, FILM COATED ORAL at 09:47

## 2018-01-16 RX ADMIN — Medication 10 MEQ: at 04:06

## 2018-01-16 RX ADMIN — IOPAMIDOL 150 ML: 755 INJECTION, SOLUTION INTRAVASCULAR at 00:15

## 2018-01-16 RX ADMIN — ASPIRIN 81 MG: 81 TABLET, COATED ORAL at 09:48

## 2018-01-16 RX ADMIN — ACETAMINOPHEN 650 MG: 325 TABLET, FILM COATED ORAL at 15:31

## 2018-01-16 RX ADMIN — TICAGRELOR 90 MG: 90 TABLET ORAL at 09:47

## 2018-01-16 RX ADMIN — METOPROLOL TARTRATE 25 MG: 25 TABLET ORAL at 20:21

## 2018-01-16 RX ADMIN — METOPROLOL TARTRATE 25 MG: 25 TABLET ORAL at 02:09

## 2018-01-16 RX ADMIN — LISINOPRIL 5 MG: 5 TABLET ORAL at 09:48

## 2018-01-16 RX ADMIN — METOPROLOL TARTRATE 25 MG: 25 TABLET ORAL at 09:48

## 2018-01-16 RX ADMIN — Medication 10 MEQ: at 02:15

## 2018-01-16 ASSESSMENT — PAIN DESCRIPTION - DESCRIPTORS
DESCRIPTORS: ACHING;DISCOMFORT
DESCRIPTORS: HEADACHE

## 2018-01-16 NOTE — PHARMACY-ADMISSION MEDICATION HISTORY
Admission medication history interview status for the 1/15/2018  admission is complete. See EPIC admission navigator for prior to admission medications     Medication history source reliability:Good    Actions taken by pharmacist (provider contacted, etc):None     Additional medication history information not noted on PTA med list :None    Medication reconciliation/reorder completed by provider prior to medication history? N/A    Time spent in this activity: 5 minutes    Prior to Admission medications    Not on File

## 2018-01-16 NOTE — CONSULTS
CARDIOLOGY CRITICAL CARE NOTE      HISTORY OF PRESENT ILLNESS:  Floresita Vanegas, a 47-year-old man, was evaluated in transfer from [facility] for evolving anterior wall ST segment elevation MI.      At about 9:45 this evening, the patient noted the abrupt onset of severe left-sided chest pressure radiating to his left shoulder, and dyspnea.  He presented to the [facility] Emergency Room.  His electrocardiogram showed a sinus rhythm with ST segment elevation in anterior precordial leads, V1 through V3, consistent with an acute anterior wall subepicardial injury pattern.  The patient received aspirin, 180 mg of ticagrelor, and the Cath Lab Team was activated.      The patient received 50 mcg of fentanyl en route.      Upon arrival in our laboratory, the patient is sleepy, but easily arousable, and answers questions with very terse answers.  He denies previous history of chest pain, myocardial infarction, diabetes, hypertension, or cigarette smoking.  He says his cholesterol level may be high, but he is not on any medication.  He denies any recent illnesses.      ALLERGIES:  None reported.     Social history : . 2 daughters. Works as .      PHYSICAL EXAMINATION:   GENERAL:  Exam demonstrates a cooperative 47-year-old man who is moderately overweight.   VITAL SIGNS:  His blood pressure is 152/112 with a heart rate of 76.  His O2 sat is 97%.   LUNGS:  Clear to percussion and auscultation.   CARDIOVASCULAR:  Normal S1 with a normal S2.  There is no S3.  There is no murmur, rub or click.  His pulses are symmetrical in the carotid, radial, brachial, femoral, popliteal, dorsalis pedis and posterior tibials.   ABDOMEN:  Bowel sounds are diminished but present.  Liver percusses to about 7 cm.  Spleen is not palpable.  The aorta is not tender or enlarged.      LABORATORY STUDIES:  His chest x-ray demonstrates normal heart size and clear lung fields.      His potassium  is 2.8, and we have begun supplementation.  His creatinine is 0.85.  His hemoglobin is 16.1, white blood cell count is 9.4, and his platelet count is 262,000.  His first troponin is less than 0.015.      His ECG shows a sinus rhythm, rate 90, with ST segment elevation of approximately 4 mm in V1, approximately 6-7 mm in V2, and about 3 mm in V3.  There is about 1 mm of elevation in V4.  There is T-wave inversion in II, III, aVF.  The appearance is compatible with an acute anterior wall ST segment elevation MI.      ASSESSMENT:  This 47-year-old man, with no prior cardiac history, presents with the abrupt onset of chest discomfort, and EKG evidence of evolving anterior wall infarction.  He has received aspirin and ticagrelor.      PLAN:   1.  Intravenous heparin.   2.  Urgent diagnostic coronary angiography, followed by revascularization.   3.  We will continue antiplatelet therapy.   4.  High-potency statin.   5.  ACE inhibitor and beta-blocker as tolerated.   6.  Echocardiogram.      We have appreciated the opportunity to participate in the care of your patient, Mr. Floresita Vanegas.     Hospital course    Urgent CAG via RTR showed no significant stenosis in RCA, nondominant CX or LMCA. The LAD was proximally occluded with a mid vessel 90% lesion. We placed a 38 x 3.0 Everolimus elutng PROMUS PREMIERE stent in the proximal and mid LAD, post dilated to 20 BEATRIZ with a noncompliant 3.0 balloon with excellent angiographic results, relief of chest pain and resolution of ST elevation. The LVEF was 45% with anteroapical hypokinesis and no MR.     CRITICAL CARE TIME:  45 minutes of critical care time in evaluation, coordination of care with the ER, and evaluation here in the Cath Lab.         ADELSO SOFIA MD             D: 01/15/2018 23:27   T: 2018 03:23   MT: BARTOLOME#[id number]      Name:     FLORESITA VANEGAS   MRN:      [id number]        Account:       [id number]   :      1970           Consult  Date:  01/15/2018      Document: [id number]       cc: Stephanie Rendon MD, FACC

## 2018-01-16 NOTE — PLAN OF CARE
Problem: Cardiac: ACS (Acute Coronary Syndrome) (Adult)  Goal: Signs and Symptoms of Listed Potential Problems Will be Absent, Minimized or Managed (Cardiac: ACS)  Signs and symptoms of listed potential problems will be absent, minimized or managed by discharge/transition of care (reference Cardiac: ACS (Acute Coronary Syndrome) (Adult) CPG).   Outcome: Improving  Pt. Admitted with chest pain from the [facility].  Ant. Wall MI - To cath lab for P/S of LAD (Right radial access site)  This AM had residual left chest discomfort.  Dr. Rendon here to assess.  Trop 120.60   VSS today and patient slept most of the day. Chest pain free this afternoon.   Continue to monitor closely

## 2018-01-16 NOTE — PROVIDER NOTIFICATION
Pt. C/o left chest discomfort that has lingered since cath lab.  Does not hurt with deep breathing.   EKG done and O2 placed.  Labs ordered.   Sinus rhythm with self limiting run of VTach   Dr. Rendon aware

## 2018-01-16 NOTE — CONSULTS
Standard post-angio consult received for heart healthy diet education  Pt on a clear liquid diet  Had angio/MOUNIKA during the night  Will plan to see pt prior to dc for diet teaching

## 2018-01-16 NOTE — PROGRESS NOTES
Cardiology Critical Care : 45 minutes    47 year old man with abrupt onset of severe substernal left sided chest pain radiating to left shoulder with dypsnea. No prior cardiac history, no medications, no known risk factors. Presented to [facility] with ST elevation in V1- V3 given  ticagrelor 180mg, asa 325 and cath team activated.       Exam 152/95 initially HR 79  Lungs clear  Heart S4 normal S1 and S2 no murmur  Pulses full and symmetrical    CXR normal   ECG NSR ST elevation V1-V3   K = 2.8  Supplementing     We performed urgent LHC, LV, CAG The LMCA, nondominant CX, dominant RCA had no significanstenosis. The LAD was occluded just after its take off from the LMCA with a mid vessel 90% lesion just after the total occlusion. We placed a 3.0 x 38 mm everolimus eluting PROMUS PREMIERE stent from the proximal to mid vessel , post dilated to 20 BEATRIZ. After intervention there was no residual stenosis with brisk distal DEANNA 3 flow and no loss of side branch. LVEDP = 26 mmHg. LVEF 45% with anteroapical hypokinesis.    Assess : Successful PCI proximal /mid LAD for AWSTEMI    Plan  1) ticagrelor 90 bid  2) asa 81 mg daily  3) metoprolol /lisinopril  4) high potency statin  5) rehab   6) Mediterranean diet    [place]

## 2018-01-16 NOTE — ED PROVIDER NOTES
History     Chief Complaint:  Chest Pain      The history is provided by the patient and the spouse.      Floresita Vanegas is a 47 year old male who presents with chest pain. The patient woke up aout 30 minutes ago with severe chest pain. Earlier in the day, the patient felt fine with some mild shortness of breath. Prior to this incident the patient has not had any significant health problems, and ha no allergies.      Allergies:  NKDA     Medications:    The patient is currently on no regular medications.      Past Medical History:    The patient denies any significant past medical history.    Past Surgical History:    The patient does not have any pertinent past surgical history  Family / Social History:    No past pertinent family history.     Social History:  The patient denies tobacco or alcohol use.  Marital Status:        Review of Systems   Constitutional: Negative for diaphoresis.   Respiratory: Positive for shortness of breath.    Cardiovascular: Positive for chest pain.   Gastrointestinal: Negative for nausea and vomiting.   All other systems reviewed and are negative.        Physical Exam   First Vitals:  BP: (!) 153/114  Heart Rate: 76  Temp: 98.8  F (37.1  C)  Resp: 18  Weight: 81.6 kg (180 lb)  SpO2: 97 %      Physical Exam  Constitutional: Patient appears well-developed and well-nourished. There is moderate distress.   Head: No external signs of trauma noted.  Neck: No JVD noted  Eyes: Pupils are equal, round, and reactive to light.   Cardiovascular: Normal rate, regular rhythm and normal heart sounds.  Exam reveals no gallop and no friction rub.  No murmur heard. Normal peripheral pulses.  Pulmonary/Chest: Effort normal and breath sounds normal. No respiratory distress. Patient has no wheezes. Patient has no rales.   Abdominal: Soft. There is no tenderness.   Extremities: No edema noted  Neurological: Patient is alert and oriented to person, place, and time.   Skin: Skin is warm and  dry. There is no diaphoresis noted on my exam.       Emergency Department Course   ECG:  Indication: chest pain  Time: 2225  Vent. Rate 90 bpm. OH interval 134. QRS duration 88. QT/QTc 372/455. P-R-T axis 58 -30 -12. Normal sinus rhythm. Left axis deviation. ST elevation, consider anterior injury or acute infarct. ** ** ACUTE MI / STEMI ** **  Read time: 2226      Imaging:  Radiographic findings were communicated with the patient and family who voiced understanding of the findings.    PCXR:  NAD (interpreted by me at bedside)    Laboratory:  CBC: WNL  BMP: pending  Troponin: pending    Interventions:  Medications   nitroGLYcerin (NITROSTAT) sublingual tablet 0.4 mg (0.4 mg Sublingual Given 1/15/18 2243)   aspirin chewable tablet 324 mg (324 mg Oral Given 1/15/18 2236)   ticagrelor (BRILINTA) tablet 180 mg (180 mg Oral Given 1/15/18 2238)     Emergency Department Course:  Nursing notes and vitals reviewed.   2227 I performed an exam of the patient as documented above.     IV inserted. Medicine administered as documented above. Blood drawn. This was sent to the lab for further testing, results above.    The patient had a PCXR while in the emergency department, findings above.     EKG obtained in the ED, see results above.     2234 I rechecked the patient and discussed the results of his workup thus far.     2237 I consulted with on-call cardiologist at [facility] regarding the patient's history and presentation here in the emergency department.    2245 I consulted with Dr. Worley, Interventional Cardiology, (a second cardiologist) regarding the patient's history and presentation here in the emergency department.    2247 I rechecked the patient and discussed the results of his workup thus far.     Findings and plan explained to the Patient and spouse. He was transferred to [facility] for definitive care.      Impression & Plan      CMS Diagnoses: The patient has a STEMI     The patient was evaluated at 2226   Patient was  transferred  to the cath lab which was activated due to ECG changes.   ASA given in the ER  Medical Decision Making:    This 47-year-old male presents to the ED due to chest pain.  Please see the HPI and exam for specifics.  EKG demonstrates clear evidence of a STEMI.  I discussed the case with 2 different cardiologists who will take the patient to the catheter lab [facility] for definitive care.  Patient and his wife are updated with the plan of care and the patient was transferred via EMS.    Diagnosis:    ICD-10-CM   1. ST elevation myocardial infarction (STEMI), unspecified artery (H) I21.3       Disposition:  Transferred to [facility] for cardiac catheterization    Discharge Medications:  New Prescriptions    No medications on file     I, Rohit Steinberg, am serving as a scribe on 1/15/2018 at 10:27 PM to personally document services performed by Vinay Umana DO based on my observations and the provider's statements to me.       Rohit Steinberg  1/15/2018   [facility] EMERGENCY DEPARTMENT       Vinay Umana DO  01/15/18 [id number]

## 2018-01-16 NOTE — ED NOTES
Patient here for left sided chest pain, numbness to left arm, dizziness, headache. Patient was sleeping and chest pain woke patient up.   ABCs intact, gcs 15, AA&Ox3.

## 2018-01-16 NOTE — CONSULTS
"Cardiology Progress Note:          Assessment and Plan:     1. STEMI - S/P PCI/stent to ostial LAD last PM. Doing well; mild residual chest discomfort not unexpected - will check EKG, echo and troponinsDAPT for one year, statin, beta blocker, ACEI.  Cardiac rehab later today if appropriate.        Subjective:     Feels better, has residual 2/10 mild anterior chest discomfort.         History of Present Illness:     Mr. Vanegas is a 47 year old man with an anterior STEMI and LAD ostial occlusion on emergent angiography.  He had a PCI and stent placed for single vessel disease.  Was seen in ED 30 minutes after onset of symptoms as pain was severe before procedure.              Physical Exam:   Vitals were reviewed  Blood pressure 123/85, temperature 99.5  F (37.5  C), temperature source Oral, resp. rate 15, height 1.702 m (5' 7\"), weight 84.4 kg (186 lb 1.6 oz), SpO2 96 %.  186 lbs 1.6 oz    Constitutional: awake, alert, cooperative, no apparent distress, and appears stated age  Eyes: sclera clear, conjunctiva normal  Neck:  thyroid symmetric, not enlarged, skin normal  Lungs: No increased work of breathing, good air exchange, clear to auscultation bilaterally, no crackles or wheezing  Cardiac: regular rate and rhythm, normal S1 and S2, no S3 or S4, and no murmur noted  Musculoskeletal: no lower extremity pitting edema present  Neurologic: Mental Status Exam:  Orientation:   person, place, time  Motor Exam:  moves all extremities well and symmetrically  Skin: normal skin color, texture, turgor and no jaundice         Data:        Lab Results   Component Value Date     01/15/2018    Lab Results   Component Value Date    CHLORIDE 102 01/15/2018    Lab Results   Component Value Date    BUN 17 01/15/2018      Lab Results   Component Value Date    POTASSIUM 4.1 01/16/2018    Lab Results   Component Value Date    CO2 26 01/15/2018    Lab Results   Component Value Date    CR 0.85 01/15/2018        Lab Results "   Component Value Date    WBC 9.4 01/15/2018    HGB 16.1 01/15/2018    HCT 47.2 01/15/2018    MCV 88 01/15/2018     01/15/2018     No results found for: INR  No results found for: TSH  Lab Results   Component Value Date    TROPI <0.015 01/15/2018     Lab Results   Component Value Date    TROPI <0.015 01/15/2018     EKG results:   I have reviewed this patient's EKG with the following interpretation:         NSR, anterior and septal ST elevation with V1 Q wave.     Chest x-ray:   No infiltrates.

## 2018-01-16 NOTE — PLAN OF CARE
Problem: Patient Care Overview  Goal: Plan of Care/Patient Progress Review  Outcome: No Change  Pt stable over night, VSS, no c/o sob,  Pt has c/o chest pain at a 1/10 since arrival from cath lab, MD aware.  Right wrist c/d/i post angio with CMS WNL's.  No new issues.

## 2018-01-16 NOTE — PLAN OF CARE
Problem: Patient Care Overview  Goal: Plan of Care/Patient Progress Review  PT/CR: Orders received, chart reviewed. Pt with troponin elevation this PM. Spoke with RN who reports not clear for OOB mobility at this time. Will r/s for tomorrow to allow for full CR intervention.

## 2018-01-17 ENCOUNTER — APPOINTMENT (OUTPATIENT)
Dept: PHYSICAL THERAPY | Facility: CLINIC | Age: 48
DRG: 247 | End: 2018-01-17
Attending: INTERNAL MEDICINE
Payer: COMMERCIAL

## 2018-01-17 ENCOUNTER — APPOINTMENT (OUTPATIENT)
Dept: CARDIOLOGY | Facility: CLINIC | Age: 48
DRG: 247 | End: 2018-01-17
Attending: INTERNAL MEDICINE
Payer: COMMERCIAL

## 2018-01-17 LAB
ANION GAP SERPL CALCULATED.3IONS-SCNC: 6 MMOL/L (ref 3–14)
BUN SERPL-MCNC: 9 MG/DL (ref 7–30)
CALCIUM SERPL-MCNC: 8.8 MG/DL (ref 8.5–10.1)
CHLORIDE SERPL-SCNC: 106 MMOL/L (ref 94–109)
CO2 SERPL-SCNC: 27 MMOL/L (ref 20–32)
CREAT SERPL-MCNC: 0.78 MG/DL (ref 0.66–1.25)
ERYTHROCYTE [DISTWIDTH] IN BLOOD BY AUTOMATED COUNT: 12.7 % (ref 10–15)
GFR SERPL CREATININE-BSD FRML MDRD: >90 ML/MIN/1.7M2
GLUCOSE SERPL-MCNC: 122 MG/DL (ref 70–99)
HCT VFR BLD AUTO: 44.6 % (ref 40–53)
HGB BLD-MCNC: 15.4 G/DL (ref 13.3–17.7)
MCH RBC QN AUTO: 30.4 PG (ref 26.5–33)
MCHC RBC AUTO-ENTMCNC: 34.5 G/DL (ref 31.5–36.5)
MCV RBC AUTO: 88 FL (ref 78–100)
PLATELET # BLD AUTO: 199 10E9/L (ref 150–450)
POTASSIUM SERPL-SCNC: 3.7 MMOL/L (ref 3.4–5.3)
RBC # BLD AUTO: 5.07 10E12/L (ref 4.4–5.9)
SODIUM SERPL-SCNC: 139 MMOL/L (ref 133–144)
WBC # BLD AUTO: 10.5 10E9/L (ref 4–11)

## 2018-01-17 PROCEDURE — 25500064 ZZH RX 255 OP 636: Performed by: INTERNAL MEDICINE

## 2018-01-17 PROCEDURE — 97530 THERAPEUTIC ACTIVITIES: CPT | Mod: GP

## 2018-01-17 PROCEDURE — 97161 PT EVAL LOW COMPLEX 20 MIN: CPT | Mod: GP

## 2018-01-17 PROCEDURE — 21000001 ZZH R&B HEART CARE

## 2018-01-17 PROCEDURE — 85027 COMPLETE CBC AUTOMATED: CPT | Performed by: INTERNAL MEDICINE

## 2018-01-17 PROCEDURE — 99232 SBSQ HOSP IP/OBS MODERATE 35: CPT | Mod: 25 | Performed by: INTERNAL MEDICINE

## 2018-01-17 PROCEDURE — 93306 TTE W/DOPPLER COMPLETE: CPT

## 2018-01-17 PROCEDURE — 93306 TTE W/DOPPLER COMPLETE: CPT | Mod: 26 | Performed by: INTERNAL MEDICINE

## 2018-01-17 PROCEDURE — 97110 THERAPEUTIC EXERCISES: CPT | Mod: GP

## 2018-01-17 PROCEDURE — 80048 BASIC METABOLIC PNL TOTAL CA: CPT | Performed by: INTERNAL MEDICINE

## 2018-01-17 PROCEDURE — 25000132 ZZH RX MED GY IP 250 OP 250 PS 637: Performed by: INTERNAL MEDICINE

## 2018-01-17 PROCEDURE — 25000132 ZZH RX MED GY IP 250 OP 250 PS 637: Performed by: NURSE PRACTITIONER

## 2018-01-17 PROCEDURE — 36415 COLL VENOUS BLD VENIPUNCTURE: CPT | Performed by: INTERNAL MEDICINE

## 2018-01-17 PROCEDURE — 40000193 ZZH STATISTIC PT WARD VISIT

## 2018-01-17 RX ORDER — DOCUSATE SODIUM 100 MG/1
100 CAPSULE, LIQUID FILLED ORAL 2 TIMES DAILY
Status: DISCONTINUED | OUTPATIENT
Start: 2018-01-17 | End: 2018-01-18 | Stop reason: HOSPADM

## 2018-01-17 RX ADMIN — LISINOPRIL 5 MG: 5 TABLET ORAL at 08:42

## 2018-01-17 RX ADMIN — DOCUSATE SODIUM 100 MG: 100 CAPSULE, LIQUID FILLED ORAL at 20:47

## 2018-01-17 RX ADMIN — ASPIRIN 81 MG: 81 TABLET, COATED ORAL at 08:42

## 2018-01-17 RX ADMIN — SULFUR HEXAFLUORIDE 5 ML: KIT at 10:41

## 2018-01-17 RX ADMIN — METOPROLOL TARTRATE 25 MG: 25 TABLET ORAL at 20:47

## 2018-01-17 RX ADMIN — METOPROLOL TARTRATE 25 MG: 25 TABLET ORAL at 08:42

## 2018-01-17 RX ADMIN — ROSUVASTATIN CALCIUM 20 MG: 20 TABLET, FILM COATED ORAL at 08:42

## 2018-01-17 RX ADMIN — TICAGRELOR 90 MG: 90 TABLET ORAL at 20:47

## 2018-01-17 RX ADMIN — DOCUSATE SODIUM 100 MG: 100 CAPSULE, LIQUID FILLED ORAL at 10:50

## 2018-01-17 RX ADMIN — TICAGRELOR 90 MG: 90 TABLET ORAL at 08:42

## 2018-01-17 NOTE — PLAN OF CARE
Problem: Patient Care Overview  Goal: Plan of Care/Patient Progress Review  CR: Orders received, evaluation completed and treatment initiated. 47 year old male admitted and found to have a STEMI. Now s/p MOUNIKA placement. Pt IND with mobility/ADLs at baseline. Works full time as a .     Discharge Planner PT   Patient plan for discharge: Home with OP CR  Current status: Pt able to perform functional mobility with IND. No balance concerns noted. Pt able to tolerate 20 minutes of continuous ambulation with gradual increase to 2.0 mph. 's during ambulation. No adverse reactions reported before, during or after activity. BP stable, see VSFS.   Barriers to return to prior living situation: None.   Recommendations for discharge: Home with OP CR at [facility]  Rationale for recommendations: Patient moving well at this time. Mobility does not appear to be a barrier to discharge home.        Entered by: Angi Kelly 01/17/2018 9:53 AM

## 2018-01-17 NOTE — PHARMACY
Cost comparison for P2Y12 inhibitors (Platelet Aggregation Inhibitors):    Brilinta copay = $25 for 30 day supply.  We can offer patient a free trial voucher for the first fill. This pt is also eligible for the  copay assistance card for refills (available to patients with commercial insurance).  ($18 - up to 90 days)      Effient copay = $50 for 30 day supply.  Generic Effient - $14    Thank you,  Wilber [facility] Discharge Pharmacy Liaison  [phone]

## 2018-01-17 NOTE — PLAN OF CARE
Problem: Patient Care Overview  Goal: Plan of Care/Patient Progress Review  Outcome: Improving  Patient going to cardiac rehab, tolerating well.  Family present all day.  No complaints of pain.  VSS, TR site stable.  Continue to monitor.

## 2018-01-17 NOTE — PROGRESS NOTES
01/17/18 0900   Quick Adds   Type of Visit Initial PT Evaluation   Living Environment   Lives With spouse   Living Arrangements house   Number of Stairs Within Home 12  (one rail)   Transportation Available car   Living Environment Comment Patient works full time as an  (desk job).   Self-Care   Usual Activity Tolerance good   Current Activity Tolerance moderate   Regular Exercise no  (Does try to walk and take stairs at work, no formal exercise)   Equipment Currently Used at Home none   Functional Level Prior   Ambulation 0-->independent   Transferring 0-->independent   Toileting 0-->independent   Bathing 0-->independent   Dressing 0-->independent   General Information   Onset of Illness/Injury or Date of Surgery - Date 01/17/18   Referring Physician MD Brunilda   Patient/Family Goals Statement Return home   Pertinent History of Current Problem (include personal factors and/or comorbidities that impact the POC) 47 year old male admitted and found to have a STEMI. Now s/p PCI and MOUNIKA placement.    Heart Disease Risk Factors Dislipidemia;Lack of physical activity   Cognitive Status Examination   Orientation orientation to person, place and time   Level of Consciousness alert   Follows Commands and Answers Questions 100% of the time;able to follow multistep instructions   Personal Safety and Judgment intact   Pain Assessment   Patient Currently in Pain No   Transfer Skills   Transfer Comments IND with transfers, gait.    Gait   Gait Comments No LOB or balance deficits   Clinical Impression   Criteria for Skilled Therapeutic Intervention yes, treatment indicated   PT Diagnosis Decreased functional activity tolerance   Influenced by the following impairments Fatigue   Functional limitations due to impairments Decreased tolerance for ADLs, higher level endurance activities   Clinical Presentation Stable/Uncomplicated   Clinical Presentation Rationale Medically stable   Clinical Decision Making (Complexity) Low  "complexity   Therapy Frequency` 2 times/day   Predicted Duration of Therapy Intervention (days/wks) 2 days   Anticipated Discharge Disposition Home with Outpatient Therapy  (CR)   Risk & Benefits of therapy have been explained Yes   Patient, Family & other staff in agreement with plan of care Yes   [facility] TM \"6 Clicks\"   2016, Trustees of [facility], under license to [employer].  All rights reserved.   6 Clicks Short Forms Basic Mobility Inpatient Short Form   [facility]  \"6 Clicks\" V.2 Basic Mobility Inpatient Short Form   1. Turning from your back to your side while in a flat bed without using bedrails? 4 - None   2. Moving from lying on your back to sitting on the side of a flat bed without using bedrails? 4 - None   3. Moving to and from a bed to a chair (including a wheelchair)? 4 - None   4. Standing up from a chair using your arms (e.g., wheelchair, or bedside chair)? 4 - None   5. To walk in hospital room? 4 - None   6. Climbing 3-5 steps with a railing? 4 - None   Basic Mobility Raw Score (Score out of 24.Lower scores equate to lower levels of function) 24     "

## 2018-01-17 NOTE — CONSULTS
NUTRITION EDUCATION    REASON FOR ASSESSMENT:  Nutrition education on American Heart Association (AHA) Heart Healthy Diet.    NUTRITION HISTORY:  Information obtained from patient and wife.      Patient states that he knows he should be following a low fat, low sodium diet but hasn't had specific diet instructions in the past.    A typical day for him includes:  Breakfast - Oatmeal or dry toast (wheat) + coffee  Lunch - Salad (plain, no dressing), fruit, Greek yogurt (Chobani) + water;  May go out to lunch if having a meeting  Dinner - P[place]inig fare --> Naan type bread, veggies (fresh), chicken (sauteed in anderson and salt), may have rice and yogurt if not having Naan bread + water  Does a little bit of snacking (ie. Cookies)  Feels like he gets close to 5 fruits and veggies per day  Does add salt to food  Likes fish but likely doesn't get one serving per week   Does not eat any red meat     CURRENT DIET ORDER:  2400 mg Na, LSF     NUTRITION DIAGNOSIS:  Food- and nutrition-related knowledge deficit R/t no previous diet education AEB above history      INTERVENTIONS:  Nutrition Prescription:    Recommended AHA Heart Healthy Diet    Implementation:     Nutrition Education (Content):  a) reviewed the Nutrition chapter in the  Understanding Artery Disease  binder  b) reviewed AHA Heart Healthy Diet guidelines  c) provided additional Healthy Heart diet handouts    Nutrition Education (Application):  a) discussed current eating habits and recommended alternative food choices    Anticipate good compliance    Diet Education - refer to Education flowsheet    Goals:    Patient verbalizes understanding of diet by asking appropriate questions     All of the above goals met during education session    Follow Up/Monitoring:    Provided RD contact information for future questions    Shelby Tillman RD, LD, CNSC   Clinical Dietitian - [facility]

## 2018-01-17 NOTE — PROGRESS NOTES
Met with patient and wife to see if he had a PCP. They will be establishing care with new PCP at [facility] in [place]. I offered to set this up for them but the wife will call today and set up appointment to be seen for next week.  If they need help with this she said she will let me know.

## 2018-01-17 NOTE — PLAN OF CARE
Problem: Cardiac: ACS (Acute Coronary Syndrome) (Adult)  Goal: Signs and Symptoms of Listed Potential Problems Will be Absent, Minimized or Managed (Cardiac: ACS)  Signs and symptoms of listed potential problems will be absent, minimized or managed by discharge/transition of care (reference Cardiac: ACS (Acute Coronary Syndrome) (Adult) CPG).   A/O, VSS.  Denies pain.  Right radial site CDI, armboard in place per pt request.  Pulse normal, strong.  Up ind to bathroom.  Plan for echo today, then discharge.

## 2018-01-17 NOTE — PROGRESS NOTES
"Cardiology Progress Note:          Assessment and Plan:     1. STEMI - S/P PCI/stent to ostial LAD 36 hours ago. Doing well; mild residual chest discomfort resolved.  EKG yesterday showed resolution of ST elevation. Echo reviewed by myself this AM showed LVEF about 45% with large anteroseptal wall motion abnormality and only mild hypokinesis without thinning.   Troponins showed early \"washout\" pattern.  DAPT for one year, statin, beta blocker, ACEI.  Cardiac rehab and home in early AM if appropriate.        Subjective:     Feels much better - \"normal\". He denies any chest discomfort.t.         History of Present Illness:     Mr. Vanegas is a 47 year old man with an anterior STEMI and LAD ostial occlusion on emergent angiography.  He had a PCI and stent placed for single vessel disease.  Was seen in ED 30 minutes after onset of symptoms as pain was severe before procedure.              Physical Exam:   Vitals were reviewed  Blood pressure 120/79, temperature 98.7  F (37.1  C), temperature source Oral, resp. rate 16, height 1.702 m (5' 7\"), weight 83.5 kg (184 lb 1.6 oz), SpO2 97 %.  184 lbs 1.6 oz    Constitutional: awake, alert, cooperative, no apparent distress, and appears stated age  Eyes: sclera clear, conjunctiva normal  Neck:  thyroid symmetric, not enlarged, skin normal  Lungs: No increased work of breathing, good air exchange, clear to auscultation bilaterally, no crackles or wheezing  Cardiac: regular rate and rhythm, normal S1 and S2, no S3 or S4, and no murmur noted  Musculoskeletal: no lower extremity pitting edema present  Neurologic: Mental Status Exam:  Orientation:   person, place, time  Motor Exam:  moves all extremities well and symmetrically  Skin: normal skin color, texture, turgor and no jaundice         Data:        Last Basic Metabolic Panel:  Lab Results   Component Value Date     01/17/2018      Lab Results   Component Value Date    POTASSIUM 3.7 01/17/2018     Lab Results "   Component Value Date    CHLORIDE 106 01/17/2018     Lab Results   Component Value Date    GER 8.8 01/17/2018     Lab Results   Component Value Date    CO2 27 01/17/2018     Lab Results   Component Value Date    BUN 9 01/17/2018     Lab Results   Component Value Date    CR 0.78 01/17/2018     Lab Results   Component Value Date     01/17/2018       Lab Results   Component Value Date    WBC 10.5 01/17/2018    HGB 15.4 01/17/2018    HCT 44.6 01/17/2018    MCV 88 01/17/2018     01/17/2018       Lab Results   Component Value Date    TROPI 86.698 (HH) 01/16/2018     Lab Results   Component Value Date    TROPI 86.698 (HH) 01/16/2018    TROPI 120.609 () 01/16/2018    TROPI <0.015 01/15/2018

## 2018-01-18 ENCOUNTER — TELEPHONE (OUTPATIENT)
Dept: CARDIOLOGY | Facility: CLINIC | Age: 48
End: 2018-01-18

## 2018-01-18 VITALS
HEIGHT: 67 IN | RESPIRATION RATE: 16 BRPM | SYSTOLIC BLOOD PRESSURE: 119 MMHG | OXYGEN SATURATION: 99 % | DIASTOLIC BLOOD PRESSURE: 83 MMHG | TEMPERATURE: 98.7 F | BODY MASS INDEX: 29.11 KG/M2 | WEIGHT: 185.5 LBS

## 2018-01-18 LAB
INTERPRETATION ECG - MUSE: NORMAL

## 2018-01-18 PROCEDURE — 25000132 ZZH RX MED GY IP 250 OP 250 PS 637: Performed by: INTERNAL MEDICINE

## 2018-01-18 PROCEDURE — 25000132 ZZH RX MED GY IP 250 OP 250 PS 637: Performed by: NURSE PRACTITIONER

## 2018-01-18 PROCEDURE — 99239 HOSP IP/OBS DSCHRG MGMT >30: CPT | Performed by: INTERNAL MEDICINE

## 2018-01-18 RX ORDER — METOPROLOL TARTRATE 25 MG/1
25 TABLET, FILM COATED ORAL 2 TIMES DAILY
Qty: 60 TABLET | Refills: 0 | Status: SHIPPED | OUTPATIENT
Start: 2018-01-18

## 2018-01-18 RX ORDER — NITROGLYCERIN 0.4 MG/1
TABLET SUBLINGUAL
Qty: 25 TABLET | Refills: 0 | Status: SHIPPED | OUTPATIENT
Start: 2018-01-18

## 2018-01-18 RX ORDER — LISINOPRIL 5 MG/1
5 TABLET ORAL DAILY
Qty: 30 TABLET | Refills: 0 | Status: SHIPPED | OUTPATIENT
Start: 2018-01-19

## 2018-01-18 RX ORDER — ROSUVASTATIN CALCIUM 20 MG/1
20 TABLET, COATED ORAL DAILY
Qty: 30 TABLET | Refills: 0 | Status: SHIPPED | OUTPATIENT
Start: 2018-01-19

## 2018-01-18 RX ADMIN — TICAGRELOR 90 MG: 90 TABLET ORAL at 08:40

## 2018-01-18 RX ADMIN — ASPIRIN 81 MG: 81 TABLET, COATED ORAL at 08:40

## 2018-01-18 RX ADMIN — ROSUVASTATIN CALCIUM 20 MG: 20 TABLET, FILM COATED ORAL at 08:40

## 2018-01-18 RX ADMIN — METOPROLOL TARTRATE 25 MG: 25 TABLET ORAL at 08:40

## 2018-01-18 RX ADMIN — LISINOPRIL 5 MG: 5 TABLET ORAL at 08:40

## 2018-01-18 RX ADMIN — DOCUSATE SODIUM 100 MG: 100 CAPSULE, LIQUID FILLED ORAL at 08:40

## 2018-01-18 NOTE — PROGRESS NOTES
Met with the patient and wife at the request of Cardiology. Per the pt and his wife they need to stay within in the [facility] system and would prefer appointments to be made in [place]. The first cardiology appointment needs to be made at [facility] due to no appointment availability in [place]. Dr Reed does go to [place] and will continue to see the pt at the [place] location. The pt also needed to establish a new PCP.  The following appointments have been made for this patient.  All [facility] appointments were cancelled per pt's request. The pt however wanted to keep the Cardiac Rehab appointments at [facility] due to the only Cardiac Rehab Clinic is at [facility].     -A follow-up appointment has been made for you with Dr. Tad Correa on  Monday, January 29th at 1:20 PM at [facility].  Please call the clinic at [phone] should you need to change or cancel your appointment.    [facility]  [address] [place] [address] [phone]        -A follow-up appointment has been made for you with Dr. Reed on Tuesday, February 6th at 10:00 AM at  [facility] at [facility]. Please call the clinic at [phone] should you need to change or cancel your appointment.  [facility]  [facility] [facility].  [place], [place] [id number] [phone]

## 2018-01-18 NOTE — DISCHARGE INSTRUCTIONS
-A follow-up appointment has been made for you with Dr. Tad Correa on  Monday, January 29th at 1:20 PM at [facility].  Please call the clinic at [phone] should you need to change or cancel your appointment.    [facility]  [address] [place], [place] [id number] [phone]        -A follow-up appointment has been made for you with Dr. Reed on Tuesday, February 6th at 10:00 AM at  [facility] at [facility]. Please call the clinic at [phone] should you need to change or cancel your appointment.    [facility]  [address] [place] [address] [phone]    A follow-up appointment was scheduled for you [see above].  It is very important to go to it--bring these papers and your insurance card with you.  At the visit, talk about your hospital stay.  Tell your doctor how you feel.  Show your new list of medications.  Your doctor will update records, make sure you are still doing OK, and decide if any tests or medication changes are needed.          You had complaints of feeling lightheaded while in hospital. It may take some time for your body to adjust to lower blood pressures and sedation to wear off. If after a couple days your symptoms do not improve or are worsening contact the heart clinic.   Rise slowly from lying to sitting to standing. Also, monitor your blood pressures and heart rate at home. If your heart rate is below 55 and symptomatic (dizzy) or Systolic (top number) is below 100 contact your heart   clinic.      Lifelong aspirin, Brilinta 90 BID for at least a year uninterrupted    Cardiac Angiogram Discharge Instructions - Radial    After you go home:      Have an adult stay with you until tomorrow.    Drink extra fluids for 2 days.    You may resume your normal diet.    No smoking       For 24 hours - due to the sedation you received:    Relax and take it easy.    Do NOT make any  important or legal decisions.    Do NOT drive or operate machines at home or at work.    Do NOT drink alcohol.    Care of Wrist Puncture Site:      For the first 24 hrs - check the puncture site every 1-2 hours while awake.    It is normal to have soreness at the puncture site and mild tingling in your hand for up to 3 days.    Remove the bandaid after 24 hours. If there is minor oozing, apply another bandaid and remove it after 12 hours.    You may shower tomorrow.  Do NOT take a bath, or use a hot tub or pool for at least 3 days. Do NOT scrub the site. Do not use lotion or powder near the puncture site.           Activity:        For 2 days:     do not use your hand or arm to support your weight (such as rising from a chair)     do not bend your wrist (such as lifting a garage door).    do not lift more than 5 pounds or exercise your arm (such as tennis, golf or bowling).    Do NOT do any heavy activity such as exercise, lifting, or straining.     Bleeding:      If you start bleeding from the site in your wrist, sit down and press firmly on/above the site for 10 minutes.     Once bleeding stops, keep arm still for 2 hours.     Call [facility] as soon as you can.       Call 911 right away if you have heavy bleeding or bleeding that does not stop.      Medicines:      If you are taking antiplatelet medications such as Plavix, Brilinta or Effient, do not stop taking it until you talk to your cardiologist.        If you are on Metformin (Glucophage), do not restart it until you have blood tests (within 2 to 3 days after discharge).  After you have your blood drawn, you may restart the Metformin.     Take your medications, including blood thinners, unless your provider tells you not to.  If you take Coumadin (Warfarin), have your INR checked by your provider in  3-5 days. Call your clinic to schedule this.    If you have stopped any medicines, check with your provider about when to restart them.    Follow Up  Appointments:      Follow up with [facility] Nurse Practitioner at [facility] of patient preference in 7-10 days.    Call the clinic if:      You have a large or growing hard lump around the site.    The site is red, swollen, hot or tender.    Blood or fluid is draining from the site.    You have chills or a fever greater than 101 F (38 C).    Your arm turns feels numb, cool or changes color.    You have hives, a rash or unusual itching.    Any questions or concerns.

## 2018-01-18 NOTE — PLAN OF CARE
Problem: Patient Care Overview  Goal: Plan of Care/Patient Progress Review  Outcome: Adequate for Discharge Date Met: 01/18/18  Pt. A&O. VSS. Tele NSR. Radial site dry and intact. CMS intact. No chest pain or SOB. Discharging home with family. Reviewed AVS including: medications, follow up plan, when to seek medical attention, care/restrictions. Given CAD binder, CAD medication handout, and information sheets on medications. Given discharge medications.

## 2018-01-18 NOTE — PLAN OF CARE
Problem: Patient Care Overview  Goal: Plan of Care/Patient Progress Review  PT/CR: Patient noted to be discharging to home with OP CR; goals partially met.  Physical Therapy Discharge Summary    Reason for therapy discharge:    Discharged to home with outpatient therapy.    Progress towards therapy goal(s). See goals on Care Plan in [facility] electronic health record for goal details.  Goals partially met.  Barriers to achieving goals:   discharge from facility.    Therapy recommendation(s):    Continued therapy is recommended.  Rationale/Recommendations:  OP CR to maximize return to PLOF.

## 2018-01-18 NOTE — PLAN OF CARE
Problem: Patient Care Overview  Goal: Plan of Care/Patient Progress Review  Outcome: Improving  A&Ox4. VSS on RA. Up ind. Tele SR. Denies pain. Rt radial site CDI and WNL. Possible d/c today. Educated  Done on Brilinta.

## 2018-01-19 ENCOUNTER — CARE COORDINATION (OUTPATIENT)
Dept: CARDIOLOGY | Facility: CLINIC | Age: 48
End: 2018-01-19

## 2018-01-19 NOTE — PROGRESS NOTES
Patient was evaluated by cardiology while inpatient for STEMI and underwent coronary angiogram resulting in MOUNIKA to LAD . Called patient to discuss any post hospital d/c questions he may have, review medication changes, and confirm f/u appts.Patient denied any questions regarding new medications or changes to some of his current medications that he was taking prior to admission. RN confirmed with patient that he was given a 30 day of supply of Brilinta. Patient denied any SOB, chest pain, or light headedness. RN confirmed with patient that we would like him to schedule f/u with his primary cardiologist at [facility] within 7-10 days. RN inquired if patient would like to keep his OP cardiac rehab about on 1/25/18 and patient confirmed that he would for now and would call to cancel if he decides to do rehab through [facility] as well. Patient advised to call clinic with any cardiac related questions or concerns. Patient verbalized understanding and agreed with plan.

## 2018-01-26 LAB — INTERPRETATION ECG - MUSE: NORMAL

## 2018-02-08 ENCOUNTER — TELEPHONE (OUTPATIENT)
Dept: CARDIOLOGY | Facility: CLINIC | Age: 48
End: 2018-02-08

## 2018-02-08 NOTE — TELEPHONE ENCOUNTER
Call from [facility], patient missed two visits but called today and wants to get started at their facility. A provider order is needed. Faxed order for cardiac rehab per Dr. Rendon.

## 2018-02-13 ENCOUNTER — HOSPITAL ENCOUNTER (OUTPATIENT)
Dept: CARDIAC REHAB | Facility: CLINIC | Age: 48
End: 2018-02-13
Attending: INTERNAL MEDICINE
Payer: COMMERCIAL

## 2018-02-13 PROCEDURE — 93798 PHYS/QHP OP CAR RHAB W/ECG: CPT | Performed by: REHABILITATION PRACTITIONER

## 2018-02-13 PROCEDURE — 93797 PHYS/QHP OP CAR RHAB WO ECG: CPT | Performed by: REHABILITATION PRACTITIONER

## 2018-02-13 PROCEDURE — 40000575 ZZH STATISTIC OP CARDIAC VISIT #2: Performed by: REHABILITATION PRACTITIONER

## 2018-02-13 PROCEDURE — 40000116 ZZH STATISTIC OP CR VISIT: Performed by: REHABILITATION PRACTITIONER

## 2018-02-15 ENCOUNTER — HOSPITAL ENCOUNTER (OUTPATIENT)
Dept: CARDIAC REHAB | Facility: CLINIC | Age: 48
End: 2018-02-15
Attending: INTERNAL MEDICINE
Payer: COMMERCIAL

## 2018-02-15 PROCEDURE — 40000116 ZZH STATISTIC OP CR VISIT

## 2018-02-15 PROCEDURE — 93798 PHYS/QHP OP CAR RHAB W/ECG: CPT

## 2018-02-20 ENCOUNTER — HOSPITAL ENCOUNTER (OUTPATIENT)
Dept: CARDIAC REHAB | Facility: CLINIC | Age: 48
End: 2018-02-20
Attending: INTERNAL MEDICINE
Payer: COMMERCIAL

## 2018-02-20 PROCEDURE — 40000116 ZZH STATISTIC OP CR VISIT

## 2018-02-20 PROCEDURE — 93798 PHYS/QHP OP CAR RHAB W/ECG: CPT

## 2018-02-22 ENCOUNTER — HOSPITAL ENCOUNTER (OUTPATIENT)
Dept: CARDIAC REHAB | Facility: CLINIC | Age: 48
End: 2018-02-22
Attending: INTERNAL MEDICINE
Payer: COMMERCIAL

## 2018-02-22 PROCEDURE — 40000116 ZZH STATISTIC OP CR VISIT

## 2018-02-22 PROCEDURE — 93798 PHYS/QHP OP CAR RHAB W/ECG: CPT

## 2018-02-27 ENCOUNTER — HOSPITAL ENCOUNTER (OUTPATIENT)
Dept: CARDIAC REHAB | Facility: CLINIC | Age: 48
End: 2018-02-27
Attending: INTERNAL MEDICINE
Payer: COMMERCIAL

## 2018-02-27 PROCEDURE — 40000116 ZZH STATISTIC OP CR VISIT: Performed by: REHABILITATION PRACTITIONER

## 2018-02-27 PROCEDURE — 93798 PHYS/QHP OP CAR RHAB W/ECG: CPT | Performed by: REHABILITATION PRACTITIONER

## 2018-03-01 ENCOUNTER — HOSPITAL ENCOUNTER (OUTPATIENT)
Dept: CARDIAC REHAB | Facility: CLINIC | Age: 48
End: 2018-03-01
Attending: INTERNAL MEDICINE
Payer: COMMERCIAL

## 2018-03-01 PROCEDURE — 40000116 ZZH STATISTIC OP CR VISIT

## 2018-03-01 PROCEDURE — 93798 PHYS/QHP OP CAR RHAB W/ECG: CPT

## 2018-03-06 ENCOUNTER — HOSPITAL ENCOUNTER (OUTPATIENT)
Dept: CARDIAC REHAB | Facility: CLINIC | Age: 48
End: 2018-03-06
Attending: INTERNAL MEDICINE
Payer: COMMERCIAL

## 2018-03-06 PROCEDURE — 93798 PHYS/QHP OP CAR RHAB W/ECG: CPT

## 2018-03-06 PROCEDURE — 40000116 ZZH STATISTIC OP CR VISIT

## 2018-03-13 ENCOUNTER — HOSPITAL ENCOUNTER (OUTPATIENT)
Dept: CARDIAC REHAB | Facility: CLINIC | Age: 48
End: 2018-03-13
Attending: INTERNAL MEDICINE
Payer: COMMERCIAL

## 2018-03-13 PROCEDURE — 40000116 ZZH STATISTIC OP CR VISIT

## 2018-03-13 PROCEDURE — 93798 PHYS/QHP OP CAR RHAB W/ECG: CPT

## 2018-03-15 ENCOUNTER — HOSPITAL ENCOUNTER (OUTPATIENT)
Dept: CARDIAC REHAB | Facility: CLINIC | Age: 48
End: 2018-03-15
Attending: INTERNAL MEDICINE
Payer: COMMERCIAL

## 2018-03-15 PROCEDURE — 40000116 ZZH STATISTIC OP CR VISIT

## 2018-03-15 PROCEDURE — 93798 PHYS/QHP OP CAR RHAB W/ECG: CPT

## 2018-03-20 ENCOUNTER — HOSPITAL ENCOUNTER (OUTPATIENT)
Dept: CARDIAC REHAB | Facility: CLINIC | Age: 48
End: 2018-03-20
Attending: INTERNAL MEDICINE
Payer: COMMERCIAL

## 2018-03-20 PROCEDURE — 93798 PHYS/QHP OP CAR RHAB W/ECG: CPT | Performed by: REHABILITATION PRACTITIONER

## 2018-03-20 PROCEDURE — 40000116 ZZH STATISTIC OP CR VISIT: Performed by: REHABILITATION PRACTITIONER

## 2018-03-22 ENCOUNTER — HOSPITAL ENCOUNTER (OUTPATIENT)
Dept: CARDIAC REHAB | Facility: CLINIC | Age: 48
End: 2018-03-22
Attending: INTERNAL MEDICINE
Payer: COMMERCIAL

## 2018-03-22 PROCEDURE — 93798 PHYS/QHP OP CAR RHAB W/ECG: CPT

## 2018-03-22 PROCEDURE — 40000116 ZZH STATISTIC OP CR VISIT

## 2018-03-27 ENCOUNTER — HOSPITAL ENCOUNTER (OUTPATIENT)
Dept: CARDIAC REHAB | Facility: CLINIC | Age: 48
End: 2018-03-27
Attending: INTERNAL MEDICINE
Payer: COMMERCIAL

## 2018-03-27 PROCEDURE — 93798 PHYS/QHP OP CAR RHAB W/ECG: CPT

## 2018-03-27 PROCEDURE — 40000116 ZZH STATISTIC OP CR VISIT

## 2018-03-29 ENCOUNTER — HOSPITAL ENCOUNTER (OUTPATIENT)
Dept: CARDIAC REHAB | Facility: CLINIC | Age: 48
End: 2018-03-29
Attending: INTERNAL MEDICINE
Payer: COMMERCIAL

## 2018-03-29 PROCEDURE — 40000116 ZZH STATISTIC OP CR VISIT

## 2018-03-29 PROCEDURE — 93798 PHYS/QHP OP CAR RHAB W/ECG: CPT

## 2018-04-03 ENCOUNTER — HOSPITAL ENCOUNTER (OUTPATIENT)
Dept: CARDIAC REHAB | Facility: CLINIC | Age: 48
End: 2018-04-03
Attending: INTERNAL MEDICINE
Payer: COMMERCIAL

## 2018-04-03 PROCEDURE — 93798 PHYS/QHP OP CAR RHAB W/ECG: CPT | Performed by: REHABILITATION PRACTITIONER

## 2018-04-03 PROCEDURE — 40000116 ZZH STATISTIC OP CR VISIT: Performed by: REHABILITATION PRACTITIONER

## 2018-04-05 ENCOUNTER — HOSPITAL ENCOUNTER (OUTPATIENT)
Dept: CARDIAC REHAB | Facility: CLINIC | Age: 48
End: 2018-04-05
Attending: INTERNAL MEDICINE
Payer: COMMERCIAL

## 2018-04-05 PROCEDURE — 93798 PHYS/QHP OP CAR RHAB W/ECG: CPT | Performed by: REHABILITATION PRACTITIONER

## 2018-04-05 PROCEDURE — 40000116 ZZH STATISTIC OP CR VISIT: Performed by: REHABILITATION PRACTITIONER

## 2018-04-10 ENCOUNTER — HOSPITAL ENCOUNTER (OUTPATIENT)
Dept: CARDIAC REHAB | Facility: CLINIC | Age: 48
End: 2018-04-10
Attending: INTERNAL MEDICINE
Payer: COMMERCIAL

## 2018-04-10 PROCEDURE — 93798 PHYS/QHP OP CAR RHAB W/ECG: CPT | Performed by: REHABILITATION PRACTITIONER

## 2018-04-10 PROCEDURE — 40000116 ZZH STATISTIC OP CR VISIT: Performed by: REHABILITATION PRACTITIONER

## 2018-04-17 ENCOUNTER — HOSPITAL ENCOUNTER (OUTPATIENT)
Dept: CARDIAC REHAB | Facility: CLINIC | Age: 48
End: 2018-04-17
Attending: INTERNAL MEDICINE
Payer: COMMERCIAL

## 2018-04-17 PROCEDURE — 93798 PHYS/QHP OP CAR RHAB W/ECG: CPT

## 2018-04-17 PROCEDURE — 40000116 ZZH STATISTIC OP CR VISIT

## 2018-04-24 ENCOUNTER — HOSPITAL ENCOUNTER (OUTPATIENT)
Dept: CARDIAC REHAB | Facility: CLINIC | Age: 48
End: 2018-04-24
Attending: INTERNAL MEDICINE
Payer: COMMERCIAL

## 2018-04-24 PROCEDURE — 93798 PHYS/QHP OP CAR RHAB W/ECG: CPT | Performed by: REHABILITATION PRACTITIONER

## 2018-04-24 PROCEDURE — 40000116 ZZH STATISTIC OP CR VISIT: Performed by: REHABILITATION PRACTITIONER

## 2021-04-03 ENCOUNTER — HEALTH MAINTENANCE LETTER (OUTPATIENT)
Age: 51
End: 2021-04-03

## 2021-09-12 ENCOUNTER — HEALTH MAINTENANCE LETTER (OUTPATIENT)
Age: 51
End: 2021-09-12

## 2022-04-24 ENCOUNTER — HEALTH MAINTENANCE LETTER (OUTPATIENT)
Age: 52
End: 2022-04-24

## 2022-11-19 ENCOUNTER — HEALTH MAINTENANCE LETTER (OUTPATIENT)
Age: 52
End: 2022-11-19

## 2023-06-01 ENCOUNTER — HEALTH MAINTENANCE LETTER (OUTPATIENT)
Age: 53
End: 2023-06-01

## 2024-05-06 NOTE — PLAN OF CARE
Problem: Cardiac: ACS (Acute Coronary Syndrome) (Adult)  Goal: Signs and Symptoms of Listed Potential Problems Will be Absent, Minimized or Managed (Cardiac: ACS)  Signs and symptoms of listed potential problems will be absent, minimized or managed by discharge/transition of care (reference Cardiac: ACS (Acute Coronary Syndrome) (Adult) CPG).   Outcome: Improving  Pt A&O x4, denies CP and Tylenol 650 mg given for headache with relief, VSS and on tele SR with PACs.  [place] - To cath lab for P/S of LAD (Right radial access site)1/15 and site C/D/I with band aid and rest board - per pt request for precautions. Trop at 0944 [id number] (Dr. Rendon aware) and at 1730 - 86.698. Echo for tomorrow. Up independently and family in room all the time. Educated about IS use and able to blow ap to 1000.   Continue to monitor closely.   unknown

## (undated) RX ORDER — NITROGLYCERIN 5 MG/ML
VIAL (ML) INTRAVENOUS
Status: DISPENSED
Start: 2018-01-15

## (undated) RX ORDER — FENTANYL CITRATE 50 UG/ML
INJECTION, SOLUTION INTRAMUSCULAR; INTRAVENOUS
Status: DISPENSED
Start: 2018-01-15

## (undated) RX ORDER — ONDANSETRON 2 MG/ML
INJECTION INTRAMUSCULAR; INTRAVENOUS
Status: DISPENSED
Start: 2018-01-15

## (undated) RX ORDER — VERAPAMIL HYDROCHLORIDE 2.5 MG/ML
INJECTION, SOLUTION INTRAVENOUS
Status: DISPENSED
Start: 2018-01-15

## (undated) RX ORDER — LIDOCAINE HYDROCHLORIDE 10 MG/ML
INJECTION, SOLUTION EPIDURAL; INFILTRATION; INTRACAUDAL; PERINEURAL
Status: DISPENSED
Start: 2018-01-15

## (undated) RX ORDER — HEPARIN SODIUM 1000 [USP'U]/ML
INJECTION, SOLUTION INTRAVENOUS; SUBCUTANEOUS
Status: DISPENSED
Start: 2018-01-15